# Patient Record
Sex: FEMALE | Race: BLACK OR AFRICAN AMERICAN | NOT HISPANIC OR LATINO | Employment: OTHER | URBAN - METROPOLITAN AREA
[De-identification: names, ages, dates, MRNs, and addresses within clinical notes are randomized per-mention and may not be internally consistent; named-entity substitution may affect disease eponyms.]

---

## 2017-05-31 ENCOUNTER — TELEPHONE (OUTPATIENT)
Dept: FAMILY MEDICINE | Facility: CLINIC | Age: 67
End: 2017-05-31

## 2017-05-31 NOTE — TELEPHONE ENCOUNTER
----- Message from Carri Rehman sent at 5/31/2017  8:40 AM CDT -----  Contact: pt  Pt wants to have a mammo done, no orders in they system, pt can be reached at [phone]///thxMW

## 2017-06-02 ENCOUNTER — TELEPHONE (OUTPATIENT)
Dept: FAMILY MEDICINE | Facility: CLINIC | Age: 67
End: 2017-06-02

## 2017-06-02 DIAGNOSIS — Z12.31 ENCOUNTER FOR SCREENING MAMMOGRAM FOR MALIGNANT NEOPLASM OF BREAST: Primary | ICD-10-CM

## 2017-06-02 NOTE — TELEPHONE ENCOUNTER
----- Message from Shiela Kirby sent at 6/2/2017  9:03 AM CDT -----  Contact: pt   Pt need order attached to [facility] appt that is schedule for 08/01/17.    ..[phone] (home)

## 2017-06-27 ENCOUNTER — OFFICE VISIT (OUTPATIENT)
Dept: OPHTHALMOLOGY | Facility: CLINIC | Age: 67
End: 2017-06-27
Payer: MEDICARE

## 2017-06-27 DIAGNOSIS — H52.7 REFRACTIVE ERROR: ICD-10-CM

## 2017-06-27 DIAGNOSIS — Z13.5 SCREENING FOR GLAUCOMA: ICD-10-CM

## 2017-06-27 PROCEDURE — 99999 PR PBB SHADOW E&M-EST. PATIENT-LVL I: CPT | Mod: PBBFAC,,, | Performed by: OPTOMETRIST

## 2017-06-27 PROCEDURE — 92015 DETERMINE REFRACTIVE STATE: CPT | Mod: ,,, | Performed by: OPTOMETRIST

## 2017-06-27 PROCEDURE — 99211 OFF/OP EST MAY X REQ PHY/QHP: CPT | Mod: PBBFAC,PO | Performed by: OPTOMETRIST

## 2017-06-27 PROCEDURE — 92014 COMPRE OPH EXAM EST PT 1/>: CPT | Mod: S$PBB,,, | Performed by: OPTOMETRIST

## 2017-06-27 NOTE — PROGRESS NOTES
HPI     Last [facility] exam 06/21/2016  Cataract, nuclear, bilateral  Screening for glaucoma  RE  No visual complaints+      Last edited by William Kirby MA on 6/27/2017  8:08 AM. (History)            Assessment /Plan     For exam results, see Encounter Report.    Cataract, nuclear, bilateral    Screening for glaucoma    Refractive error      Mild NS cataracts OU without complaint = discussed and will follow.  OH OK OU otherwise.  Spec Rx given versus OTC readers.  RTC one year.

## 2017-08-01 ENCOUNTER — HOSPITAL ENCOUNTER (OUTPATIENT)
Dept: RADIOLOGY | Facility: HOSPITAL | Age: 67
Discharge: HOME | End: 2017-08-01
Attending: FAMILY MEDICINE
Payer: MEDICARE

## 2017-08-01 VITALS — BODY MASS INDEX: 22.45 KG/M2 | WEIGHT: 122 LBS | HEIGHT: 62 IN

## 2017-08-01 DIAGNOSIS — Z12.31 ENCOUNTER FOR SCREENING MAMMOGRAM FOR MALIGNANT NEOPLASM OF BREAST: ICD-10-CM

## 2017-08-01 PROCEDURE — 77067 SCR MAMMO BI INCL CAD: CPT | Mod: 26,,, | Performed by: RADIOLOGY

## 2017-08-01 PROCEDURE — 77067 SCR MAMMO BI INCL CAD: CPT | Mod: TC

## 2017-08-01 PROCEDURE — 77063 BREAST TOMOSYNTHESIS BI: CPT | Mod: 26,,, | Performed by: RADIOLOGY

## 2017-09-25 ENCOUNTER — OFFICE VISIT (OUTPATIENT)
Dept: FAMILY MEDICINE | Facility: CLINIC | Age: 67
End: 2017-09-25
Payer: MEDICARE

## 2017-09-25 ENCOUNTER — LAB VISIT (OUTPATIENT)
Dept: LAB | Facility: HOSPITAL | Age: 67
End: 2017-09-25
Attending: FAMILY MEDICINE
Payer: MEDICARE

## 2017-09-25 VITALS
HEIGHT: 62 IN | WEIGHT: 120.56 LBS | TEMPERATURE: 96 F | HEART RATE: 76 BPM | SYSTOLIC BLOOD PRESSURE: 126 MMHG | DIASTOLIC BLOOD PRESSURE: 74 MMHG | RESPIRATION RATE: 18 BRPM | BODY MASS INDEX: 22.19 KG/M2

## 2017-09-25 DIAGNOSIS — I10 ESSENTIAL HYPERTENSION: Primary | ICD-10-CM

## 2017-09-25 DIAGNOSIS — E78.5 HYPERLIPIDEMIA, UNSPECIFIED HYPERLIPIDEMIA TYPE: ICD-10-CM

## 2017-09-25 DIAGNOSIS — I10 ESSENTIAL HYPERTENSION: ICD-10-CM

## 2017-09-25 DIAGNOSIS — M85.80 OSTEOPENIA, UNSPECIFIED LOCATION: ICD-10-CM

## 2017-09-25 DIAGNOSIS — R42 VERTIGO: ICD-10-CM

## 2017-09-25 DIAGNOSIS — Z01.419 WELL FEMALE EXAM WITH ROUTINE GYNECOLOGICAL EXAM: ICD-10-CM

## 2017-09-25 LAB
ALBUMIN SERPL BCP-MCNC: 3.9 G/DL
ALP SERPL-CCNC: 61 U/L
ALT SERPL W/O P-5'-P-CCNC: 15 U/L
ANION GAP SERPL CALC-SCNC: 11 MMOL/L
AST SERPL-CCNC: 27 U/L
BASOPHILS # BLD AUTO: 0.09 K/UL
BASOPHILS NFR BLD: 1.1 %
BILIRUB SERPL-MCNC: 0.5 MG/DL
BUN SERPL-MCNC: 20 MG/DL
CALCIUM SERPL-MCNC: 10.7 MG/DL
CHLORIDE SERPL-SCNC: 104 MMOL/L
CHOLEST SERPL-MCNC: 222 MG/DL
CHOLEST/HDLC SERPL: 3.6 {RATIO}
CO2 SERPL-SCNC: 25 MMOL/L
CREAT SERPL-MCNC: 1.3 MG/DL
DIFFERENTIAL METHOD: NORMAL
EOSINOPHIL # BLD AUTO: 0.2 K/UL
EOSINOPHIL NFR BLD: 2.5 %
ERYTHROCYTE [DISTWIDTH] IN BLOOD BY AUTOMATED COUNT: 13.4 %
EST. GFR  (AFRICAN AMERICAN): 49.1 ML/MIN/1.73 M^2
EST. GFR  (NON AFRICAN AMERICAN): 42.6 ML/MIN/1.73 M^2
GLUCOSE SERPL-MCNC: 81 MG/DL
HCT VFR BLD AUTO: 37.7 %
HDLC SERPL-MCNC: 61 MG/DL
HDLC SERPL: 27.5 %
HGB BLD-MCNC: 12.1 G/DL
LDLC SERPL CALC-MCNC: 143.8 MG/DL
LYMPHOCYTES # BLD AUTO: 2.8 K/UL
LYMPHOCYTES NFR BLD: 35.3 %
MCH RBC QN AUTO: 28.4 PG
MCHC RBC AUTO-ENTMCNC: 32.1 G/DL
MCV RBC AUTO: 89 FL
MONOCYTES # BLD AUTO: 0.8 K/UL
MONOCYTES NFR BLD: 9.9 %
NEUTROPHILS # BLD AUTO: 4.1 K/UL
NEUTROPHILS NFR BLD: 51.1 %
NONHDLC SERPL-MCNC: 161 MG/DL
PLATELET # BLD AUTO: 339 K/UL
PMV BLD AUTO: 10.8 FL
POTASSIUM SERPL-SCNC: 3.3 MMOL/L
PROT SERPL-MCNC: 8.2 G/DL
RBC # BLD AUTO: 4.26 M/UL
SODIUM SERPL-SCNC: 140 MMOL/L
TRIGL SERPL-MCNC: 86 MG/DL
TSH SERPL DL<=0.005 MIU/L-ACNC: 0.93 UIU/ML
WBC # BLD AUTO: 8 K/UL

## 2017-09-25 PROCEDURE — 99999 PR PBB SHADOW E&M-EST. PATIENT-LVL III: CPT | Mod: PBBFAC,,, | Performed by: FAMILY MEDICINE

## 2017-09-25 PROCEDURE — 84443 ASSAY THYROID STIM HORMONE: CPT

## 2017-09-25 PROCEDURE — 99214 OFFICE O/P EST MOD 30 MIN: CPT | Mod: 25,S$PBB,, | Performed by: FAMILY MEDICINE

## 2017-09-25 PROCEDURE — 80061 LIPID PANEL: CPT

## 2017-09-25 PROCEDURE — 36415 COLL VENOUS BLD VENIPUNCTURE: CPT | Mod: PO

## 2017-09-25 PROCEDURE — 99213 OFFICE O/P EST LOW 20 MIN: CPT | Mod: PBBFAC,PO,25 | Performed by: FAMILY MEDICINE

## 2017-09-25 PROCEDURE — G0008 ADMIN INFLUENZA VIRUS VAC: HCPCS | Mod: PBBFAC,PO

## 2017-09-25 PROCEDURE — G0101 CA SCREEN;PELVIC/BREAST EXAM: HCPCS | Mod: PBBFAC,PO | Performed by: FAMILY MEDICINE

## 2017-09-25 PROCEDURE — 1126F AMNT PAIN NOTED NONE PRSNT: CPT | Mod: ,,, | Performed by: FAMILY MEDICINE

## 2017-09-25 PROCEDURE — 80053 COMPREHEN METABOLIC PANEL: CPT

## 2017-09-25 PROCEDURE — 1159F MED LIST DOCD IN RCRD: CPT | Mod: ,,, | Performed by: FAMILY MEDICINE

## 2017-09-25 PROCEDURE — G0101 CA SCREEN;PELVIC/BREAST EXAM: HCPCS | Mod: S$PBB,,, | Performed by: FAMILY MEDICINE

## 2017-09-25 PROCEDURE — 85025 COMPLETE CBC W/AUTO DIFF WBC: CPT

## 2017-09-25 PROCEDURE — 3074F SYST BP LT 130 MM HG: CPT | Mod: ,,, | Performed by: FAMILY MEDICINE

## 2017-09-25 PROCEDURE — 3078F DIAST BP <80 MM HG: CPT | Mod: ,,, | Performed by: FAMILY MEDICINE

## 2017-09-25 RX ORDER — ONDANSETRON 4 MG/1
4 TABLET, FILM COATED ORAL EVERY 12 HOURS PRN
Qty: 12 TABLET | Refills: 5 | Status: SHIPPED | OUTPATIENT
Start: 2017-09-25 | End: 2019-10-31

## 2017-09-25 RX ORDER — HYDROCHLOROTHIAZIDE 12.5 MG/1
12.5 CAPSULE ORAL DAILY
Qty: 30 CAPSULE | Refills: 11 | Status: SHIPPED | OUTPATIENT
Start: 2017-09-25 | End: 2018-09-21 | Stop reason: SDUPTHER

## 2017-09-25 RX ORDER — MECLIZINE HYDROCHLORIDE 25 MG/1
25-50 TABLET ORAL
Qty: 30 TABLET | Refills: 5 | Status: SHIPPED | OUTPATIENT
Start: 2017-09-25 | End: 2018-09-28

## 2017-09-25 RX ORDER — IBUPROFEN 800 MG/1
800 TABLET ORAL 3 TIMES DAILY PRN
Qty: 60 TABLET | Refills: 5 | Status: SHIPPED | OUTPATIENT
Start: 2017-09-25 | End: 2018-09-28 | Stop reason: SDUPTHER

## 2017-09-25 NOTE — PROGRESS NOTES
CHIEF COMPLAINT: This is a 66-year-old female here for preventive health exam.    SUBJECTIVE: Patient's doing well without complaints except for bump on posterior neck.  She reports that knee pain has diminished.  Blood pressure is controlled on hydrochlorothiazide.  Patient requests refill of meclizine and ondansetron for dizziness and nausea.  She takes ibuprofen 800 mg as needed for knee pain.     Eye exam June 2017.  Pap smear September 2014. Mammogram, August 2017.  Bone DEXA scan, October 2016, due again in October 2019 to October 2021.  Colonoscopy May 2010, due again in May 2018. Tdap August 2012. Zostavax August 2012.  Prevnar vaccine September 2015.  Pneumovax September 2016.     ROS:  GENERAL: Patient denies fever, chills, night sweats. Patient denies weight gain or loss. Patient denies anorexia, fatigue, weakness or swollen glands.  SKIN: Patient denies rash or hair loss.  HEENT: Patient denies sore throat, ear pain, hearing loss, nasal congestion, or runny nose. Patient denies visual disturbance, eye irritation or discharge.  LUNGS: Patient denies cough, wheeze or hemoptysis.  CARDIOVASCULAR: Patient denies chest pain, shortness of breath, palpitations, syncope or lower extremity edema.  GI: Patient denies abdominal pain, nausea, vomiting, diarrhea, constipation, blood in stool or melena.  GENITOURINARY: Patient denies pelvic pain, vaginal discharge, itch or odor. Patient denies irregular vaginal bleeding. Patient denies dysuria, frequency, hematuria, nocturia, urgency or incontinence.  BREASTS: Patient denies breast pain, mass or nipple discharge.  MUSCULOSKELETAL: Patient denies joint swelling, redness or warmth.  NEUROLOGIC: Patient denies headache, vertigo, paresthesias, weakness in limb, dysarthria, dysphagia or abnormality of gait.  PSYCHIATRIC: Patient denies anxiety, depression, or memory loss.     OBJECTIVE:   GENERAL: Well-developed well-nourished black female alert and oriented x3, in no  acute distress.   SKIN: Clear without rash.  Normal color and tone.  Sebaceous cyst, posterior neck.  HEENT: Eyes: Clear conjunctivae. No scleral icterus. Pupils equal reactive to light and accommodation. Ears: Clear TMs. Clear canals. Nose: Without congestion. Pharynx: Without injection or exudates.    NECK: Supple, normal range of motion. No masses, lymphadenopathy or enlarged thyroid. No JVD. Carotids 2+ and equal. No bruits.    LUNGS: Clear to auscultation. Normal respiratory effort.    CARDIOVASCULAR: Regular rhythm, normal S1, S2 without murmur, gallop or rub.    BACK: No CVA or spinal tenderness.    BREASTS: No masses, tenderness or nipple discharge.    ABDOMEN: Normal appearance. Active bowel sounds. Soft, nontender without mass or organomegaly. No rebound or guarding.    EXTREMITIES: Without cyanosis, clubbing or edema. Distal pulses 2+ and equal. Normal range of motion in all extremities. No joint effusion, erythema or warmth. Bunions bilaterally, left greater than right.  Right knee enlargement greater than left.  NEUROLOGIC: Cranial nerves II through XII without deficit. Motor strength equal bilaterally. Sensation normal to touch. Deep tendon reflexes 2+ and equal. Gait without abnormality. No tremor. Negative cerebellar signs.    PELVIC: External: Without lesions or inflammation. Vaginal: Atrophic changes. Cervix: Normal appearance, nontender. No Pap. Uterus: Normal size and shape. Adnexa: Non-tender, without masses. Rectovaginal: Confirms, heme-negative stool x2.       ASSESSMENT:  1. Essential hypertension    2. Hyperlipidemia, unspecified hyperlipidemia type    3. Osteopenia, unspecified location    4. Vertigo      PLAN:   1.  Exercise regularly.  2.  Age-appropriate counseling.  3.  Fasting lab.  4.  Influenza vaccine.  5.  Refill medications.  6.  Follow-up annually.

## 2018-03-23 ENCOUNTER — TELEPHONE (OUTPATIENT)
Dept: FAMILY MEDICINE | Facility: CLINIC | Age: 68
End: 2018-03-23

## 2018-03-23 DIAGNOSIS — Z12.31 ENCOUNTER FOR SCREENING MAMMOGRAM FOR MALIGNANT NEOPLASM OF BREAST: Primary | ICD-10-CM

## 2018-03-23 NOTE — TELEPHONE ENCOUNTER
----- Message from Shelly Godwin sent at 3/23/2018 10:44 AM CDT -----  Contact: Pt   Pt called and stated she needed to speak to the nurse.    Thanks,  TF

## 2018-06-27 ENCOUNTER — OFFICE VISIT (OUTPATIENT)
Dept: OPHTHALMOLOGY | Facility: CLINIC | Age: 68
End: 2018-06-27
Payer: MEDICARE

## 2018-06-27 DIAGNOSIS — H25.13 CATARACT, NUCLEAR SCLEROTIC SENILE, BILATERAL: Primary | ICD-10-CM

## 2018-06-27 DIAGNOSIS — H52.13 MYOPIA OF BOTH EYES: ICD-10-CM

## 2018-06-27 DIAGNOSIS — H52.4 PRESBYOPIA: ICD-10-CM

## 2018-06-27 DIAGNOSIS — Z13.5 GLAUCOMA SCREENING: ICD-10-CM

## 2018-06-27 PROCEDURE — 99999 PR PBB SHADOW E&M-EST. PATIENT-LVL I: CPT | Mod: PBBFAC,,, | Performed by: OPTOMETRIST

## 2018-06-27 PROCEDURE — 92014 COMPRE OPH EXAM EST PT 1/>: CPT | Mod: S$PBB,,, | Performed by: OPTOMETRIST

## 2018-06-27 PROCEDURE — 99211 OFF/OP EST MAY X REQ PHY/QHP: CPT | Mod: PBBFAC,PO | Performed by: OPTOMETRIST

## 2018-06-27 PROCEDURE — 92015 DETERMINE REFRACTIVE STATE: CPT | Mod: ,,, | Performed by: OPTOMETRIST

## 2018-06-27 NOTE — PROGRESS NOTES
HPI     Last [facility] exam 06/27/2017  Cataract, nuclear, bilateral  Screening for glaucoma  RE  No visual complaint  Using OTC Readers +2.75    Last edited by William Kirby MA on 6/27/2018  8:02 AM. (History)            Assessment /Plan     For exam results, see Encounter Report.    Cataract, nuclear sclerotic senile, bilateral    Glaucoma screening    Myopia of both eyes    Presbyopia      Mild NS cataracts without complaint = will follow.  OH OK OU otherwise.  Spec Rx given versus OTC readers.  RTC one year or prn.

## 2018-08-02 ENCOUNTER — HOSPITAL ENCOUNTER (OUTPATIENT)
Dept: RADIOLOGY | Facility: HOSPITAL | Age: 68
Discharge: HOME | End: 2018-08-02
Attending: FAMILY MEDICINE
Payer: MEDICARE

## 2018-08-02 VITALS — WEIGHT: 120 LBS | HEIGHT: 62 IN | BODY MASS INDEX: 22.08 KG/M2

## 2018-08-02 DIAGNOSIS — Z12.31 ENCOUNTER FOR SCREENING MAMMOGRAM FOR MALIGNANT NEOPLASM OF BREAST: ICD-10-CM

## 2018-08-02 PROCEDURE — 77063 BREAST TOMOSYNTHESIS BI: CPT | Mod: 26,,, | Performed by: RADIOLOGY

## 2018-08-02 PROCEDURE — 77063 BREAST TOMOSYNTHESIS BI: CPT | Mod: TC,PO

## 2018-08-02 PROCEDURE — 77067 SCR MAMMO BI INCL CAD: CPT | Mod: 26,,, | Performed by: RADIOLOGY

## 2018-09-14 ENCOUNTER — PATIENT OUTREACH (OUTPATIENT)
Dept: ADMINISTRATIVE | Facility: HOSPITAL | Age: 68
End: 2018-09-14

## 2018-09-21 ENCOUNTER — PES CALL (OUTPATIENT)
Dept: ADMINISTRATIVE | Facility: CLINIC | Age: 68
End: 2018-09-21

## 2018-09-21 RX ORDER — HYDROCHLOROTHIAZIDE 12.5 MG/1
12.5 CAPSULE ORAL DAILY
Qty: 30 CAPSULE | Refills: 0 | Status: SHIPPED | OUTPATIENT
Start: 2018-09-21 | End: 2018-10-24 | Stop reason: SDUPTHER

## 2018-09-28 ENCOUNTER — OFFICE VISIT (OUTPATIENT)
Dept: FAMILY MEDICINE | Facility: CLINIC | Age: 68
End: 2018-09-28
Payer: MEDICARE

## 2018-09-28 ENCOUNTER — LAB VISIT (OUTPATIENT)
Dept: LAB | Facility: HOSPITAL | Age: 68
End: 2018-09-28
Attending: FAMILY MEDICINE
Payer: MEDICARE

## 2018-09-28 VITALS
HEART RATE: 54 BPM | OXYGEN SATURATION: 97 % | HEIGHT: 62 IN | WEIGHT: 115.06 LBS | TEMPERATURE: 99 F | SYSTOLIC BLOOD PRESSURE: 124 MMHG | OXYGEN SATURATION: 97 % | BODY MASS INDEX: 21.17 KG/M2 | DIASTOLIC BLOOD PRESSURE: 72 MMHG | HEIGHT: 62 IN | DIASTOLIC BLOOD PRESSURE: 72 MMHG | BODY MASS INDEX: 21.17 KG/M2 | WEIGHT: 115.06 LBS | SYSTOLIC BLOOD PRESSURE: 124 MMHG | TEMPERATURE: 99 F | HEART RATE: 54 BPM

## 2018-09-28 DIAGNOSIS — Z00.00 ENCOUNTER FOR PREVENTIVE HEALTH EXAMINATION: Primary | ICD-10-CM

## 2018-09-28 DIAGNOSIS — M17.0 PRIMARY OSTEOARTHRITIS OF BOTH KNEES: ICD-10-CM

## 2018-09-28 DIAGNOSIS — I10 ESSENTIAL HYPERTENSION: Primary | ICD-10-CM

## 2018-09-28 DIAGNOSIS — M85.80 OSTEOPENIA, UNSPECIFIED LOCATION: ICD-10-CM

## 2018-09-28 DIAGNOSIS — R42 VERTIGO: ICD-10-CM

## 2018-09-28 DIAGNOSIS — E55.9 VITAMIN D DEFICIENCY DISEASE: ICD-10-CM

## 2018-09-28 DIAGNOSIS — I10 ESSENTIAL HYPERTENSION: ICD-10-CM

## 2018-09-28 DIAGNOSIS — Z12.11 COLON CANCER SCREENING: ICD-10-CM

## 2018-09-28 DIAGNOSIS — E78.5 HYPERLIPIDEMIA, UNSPECIFIED HYPERLIPIDEMIA TYPE: ICD-10-CM

## 2018-09-28 LAB
ALBUMIN SERPL BCP-MCNC: 4.3 G/DL
ALP SERPL-CCNC: 50 U/L
ALT SERPL W/O P-5'-P-CCNC: 17 U/L
ANION GAP SERPL CALC-SCNC: 9 MMOL/L
AST SERPL-CCNC: 32 U/L
BASOPHILS # BLD AUTO: 0.08 K/UL
BASOPHILS NFR BLD: 1.4 %
BILIRUB SERPL-MCNC: 0.7 MG/DL
BUN SERPL-MCNC: 19 MG/DL
CALCIUM SERPL-MCNC: 10.2 MG/DL
CHLORIDE SERPL-SCNC: 105 MMOL/L
CHOLEST SERPL-MCNC: 198 MG/DL
CHOLEST/HDLC SERPL: 3.3 {RATIO}
CO2 SERPL-SCNC: 26 MMOL/L
CREAT SERPL-MCNC: 1.1 MG/DL
DIFFERENTIAL METHOD: ABNORMAL
EOSINOPHIL # BLD AUTO: 0.1 K/UL
EOSINOPHIL NFR BLD: 2.1 %
ERYTHROCYTE [DISTWIDTH] IN BLOOD BY AUTOMATED COUNT: 12.3 %
EST. GFR  (AFRICAN AMERICAN): 59.6 ML/MIN/1.73 M^2
EST. GFR  (NON AFRICAN AMERICAN): 51.7 ML/MIN/1.73 M^2
GLUCOSE SERPL-MCNC: 88 MG/DL
HCT VFR BLD AUTO: 39 %
HDLC SERPL-MCNC: 60 MG/DL
HDLC SERPL: 30.3 %
HGB BLD-MCNC: 12.2 G/DL
IMM GRANULOCYTES # BLD AUTO: 0.01 K/UL
IMM GRANULOCYTES NFR BLD AUTO: 0.2 %
LDLC SERPL CALC-MCNC: 123 MG/DL
LYMPHOCYTES # BLD AUTO: 2.2 K/UL
LYMPHOCYTES NFR BLD: 37.6 %
MCH RBC QN AUTO: 28.1 PG
MCHC RBC AUTO-ENTMCNC: 31.3 G/DL
MCV RBC AUTO: 90 FL
MONOCYTES # BLD AUTO: 0.6 K/UL
MONOCYTES NFR BLD: 10.1 %
NEUTROPHILS # BLD AUTO: 2.8 K/UL
NEUTROPHILS NFR BLD: 48.6 %
NONHDLC SERPL-MCNC: 138 MG/DL
NRBC BLD-RTO: 0 /100 WBC
PLATELET # BLD AUTO: 322 K/UL
PMV BLD AUTO: 10.7 FL
POTASSIUM SERPL-SCNC: 2.9 MMOL/L
PROT SERPL-MCNC: 8.3 G/DL
RBC # BLD AUTO: 4.34 M/UL
SODIUM SERPL-SCNC: 140 MMOL/L
TRIGL SERPL-MCNC: 75 MG/DL
TSH SERPL DL<=0.005 MIU/L-ACNC: 1.07 UIU/ML
WBC # BLD AUTO: 5.77 K/UL

## 2018-09-28 PROCEDURE — G0439 PPPS, SUBSEQ VISIT: HCPCS | Mod: S$GLB,,, | Performed by: NURSE PRACTITIONER

## 2018-09-28 PROCEDURE — 99215 OFFICE O/P EST HI 40 MIN: CPT | Mod: PBBFAC,PO | Performed by: FAMILY MEDICINE

## 2018-09-28 PROCEDURE — 84443 ASSAY THYROID STIM HORMONE: CPT

## 2018-09-28 PROCEDURE — 80061 LIPID PANEL: CPT

## 2018-09-28 PROCEDURE — 85025 COMPLETE CBC W/AUTO DIFF WBC: CPT

## 2018-09-28 PROCEDURE — 99999 PR PBB SHADOW E&M-EST. PATIENT-LVL IV: CPT | Mod: PBBFAC,,, | Performed by: NURSE PRACTITIONER

## 2018-09-28 PROCEDURE — 99214 OFFICE O/P EST MOD 30 MIN: CPT | Mod: PBBFAC,25,27,PO | Performed by: NURSE PRACTITIONER

## 2018-09-28 PROCEDURE — 80053 COMPREHEN METABOLIC PANEL: CPT

## 2018-09-28 PROCEDURE — 90662 IIV NO PRSV INCREASED AG IM: CPT | Mod: PBBFAC,PO

## 2018-09-28 PROCEDURE — 99999 PR PBB SHADOW E&M-EST. PATIENT-LVL V: CPT | Mod: PBBFAC,,, | Performed by: FAMILY MEDICINE

## 2018-09-28 PROCEDURE — 99214 OFFICE O/P EST MOD 30 MIN: CPT | Mod: 25,S$PBB,, | Performed by: FAMILY MEDICINE

## 2018-09-28 PROCEDURE — 36415 COLL VENOUS BLD VENIPUNCTURE: CPT | Mod: PO

## 2018-09-28 RX ORDER — CHOLECALCIFEROL (VITAMIN D3) 25 MCG
1000 TABLET ORAL 2 TIMES DAILY
COMMUNITY

## 2018-09-28 RX ORDER — CALCIUM CARBONATE 600 MG
600 TABLET ORAL DAILY
COMMUNITY

## 2018-09-28 RX ORDER — IRON 18 MG
TABLET ORAL
COMMUNITY

## 2018-09-28 RX ORDER — IBUPROFEN 800 MG/1
800 TABLET ORAL 3 TIMES DAILY PRN
Qty: 60 TABLET | Refills: 5 | Status: SHIPPED | OUTPATIENT
Start: 2018-09-28 | End: 2019-10-31

## 2018-09-28 NOTE — Clinical Note
Your patient was seen today for a HRA visit.I have included a copy of my visit note, please review the note and feel free to contact me with any questions. Thank you for allowing me to participate in the care of your patients. Delicia Rehman NP

## 2018-09-28 NOTE — PATIENT INSTRUCTIONS
Counseling and Referral of Other Preventative  (Italic type indicates deductible and co-insurance are waived)    Patient Name: Josy Rehman  Today's Date: 9/28/2018    Health Maintenance       Date Due Completion Date    Colonoscopy 05/20/2018 5/20/2010    Influenza Vaccine 08/01/2018 9/25/2017    Lipid Panel 09/25/2018 9/25/2017    Mammogram 08/02/2019 8/2/2018    DEXA SCAN 10/03/2019 10/3/2016    Override on 8/24/2012: Done (REPEAT 3 YRS)    TETANUS VACCINE 08/13/2022 8/13/2012        No orders of the defined types were placed in this encounter.    The following information is provided to all patients.  This information is to help you find resources for any of the problems found today that may be affecting your health:                Living healthy guide: www.[facility].[place].gov      Understanding Diabetes: www.diabetes.org      Eating healthy: www.cdc.gov/healthyweight      [facility] home safety checklist: www.cdc.gov/steadi/patient.html      Agency on Aging: www.goea.[place].gov      Alcoholics anonymous (AA): www.aa.org      Physical Activity: www.cindy.nih.gov/tv1ooha      Tobacco use: www.quitwithusla.org

## 2018-09-28 NOTE — PROGRESS NOTES
"Josy Rehman presented for a  Medicare AWV and comprehensive Health Risk Assessment today. The following components were reviewed and updated:    · Medical history  · Family History  · Social history  · Allergies and Current Medications  · Health Risk Assessment  · Health Maintenance  · Care Team     ** See Completed Assessments for Annual Wellness Visit within the encounter summary.**       The following assessments were completed:  · Living Situation  · CAGE  · Depression Screening  · Timed Get Up and Go  · Whisper Test  · Cognitive Function Screening  · Nutrition Screening  · ADL Screening  · PAQ Screening    Vitals:    09/28/18 0841   BP: 124/72   BP Location: Left arm   Patient Position: Sitting   Pulse: (!) 54   Temp: 98.7 °F (37.1 °C)   TempSrc: Tympanic   SpO2: 97%   Weight: 52.2 kg (115 lb 1.3 oz)   Height: 5' 2" (1.575 m)     Body mass index is 21.05 kg/m².  Physical Exam   Constitutional: She appears well-developed.   HENT:   Head: Normocephalic and atraumatic.   Eyes: Pupils are equal, round, and reactive to light.   Neck: Carotid bruit is not present.   Cardiovascular: Normal rate, regular rhythm, normal heart sounds, intact distal pulses and normal pulses. Exam reveals no gallop.   No murmur heard.  Pulmonary/Chest: Effort normal and breath sounds normal.   Abdominal: Soft. Normal appearance and bowel sounds are normal. She exhibits no distension. There is no tenderness.   Musculoskeletal: Normal range of motion. She exhibits no edema or tenderness.   Neurological: She is alert. She exhibits normal muscle tone. Gait normal.   Skin: Skin is warm, dry and intact.   Psychiatric: She has a normal mood and affect. Her speech is normal and behavior is normal. Judgment and thought content normal. Cognition and memory are normal.   Nursing note and vitals reviewed.        Diagnoses and health risks identified today and associated recommendations/orders:    1. Encounter for preventive health examination    2. " Essential hypertension  Chronic and Stable on HTCZ daily. Continue current treatment plan as previously prescribed with your PCP    3. Vertigo  Chronic and Stable . States no recent episode of dizziness but has Antivert as needed. Continue current treatment plan as previously prescribed with your PCP    4. Osteopenia, unspecified location  DEXA 10/ 13/ 2016 ...... Due for a repeat in  3 years  Chronic and Stable on vitamin D and calicum supplements. Continue current treatment plan as previously prescribed with your PCP    5. Vitamin D deficiency disease  Chronic and Stable on Vitamin D supplements. Continue current treatment plan as previously prescribed with your PCP    6. Hyperlipidemia, unspecified hyperlipidemia type  Component      Latest Ref Rng & Units 9/28/2018   Cholesterol      120 - 199 mg/dL 198   Triglycerides      30 - 150 mg/dL 75   HDL      40 - 75 mg/dL 60   LDL Cholesterol      63.0 - 159.0 mg/dL 123.0   HDL/Chol Ratio      20.0 - 50.0 % 30.3   Total Cholesterol/HDL Ratio      2.0 - 5.0 3.3   Non-HDL Cholesterol      mg/dL 138   Chronic and Stable on diet modifications  Continue current treatment plan as previously prescribed with your PCP    7. Primary osteoarthritis of both knees  Chronic and Stable on Tylenol as needed . Continue current treatment plan as previously prescribed with your PCP    Pt to call and schedule colonoscopy today and follow PCP for physical exam today     I offered to discuss end of life issues, including information on how to make advance directives that the patient could use to name someone who would make medical decisions on their behalf if they became too ill to make themselves.    ___Patient declined  _X_Patient is interested, I provided paper work and offered to discuss.    Provided Josy with a 5-10 year written screening schedule and personal prevention plan. Recommendations were developed using the USPSTF age appropriate recommendations. Education, counseling, and  referrals were provided as needed. After Visit Summary printed and given to patient which includes a list of additional screenings\tests needed.    Follow-up in about 1 year (around 9/28/2019).    Delicia Rehman NP

## 2018-09-28 NOTE — PROGRESS NOTES
CHIEF COMPLAINT:  This is a 67-year-old female here for preventive health exam.    SUBJECTIVE: Patient's doing well without complaints.  She has lost 5 lb in the last year eating a more healthy diet.  She reports that knee pain has diminished but she takes ibuprofen 800 mg occasionally when pain flares up or for headache.  She has hypertension which is  controlled on hydrochlorothiazide.  Patient has not had any recent episodes of vertigo for which she has taken meclizine and ondansetron in the past.      Eye exam June 2018 .  Pap smear September 2014. Mammogram, August 2018 .  Bone DEXA scan, October 2016, due again in October 2019 to October 2021.  Colonoscopy May 2010, due again in May 2018. Tdap August 2012. Zostavax August 2012.  Prevnar vaccine September 2015.  Pneumovax September 2016.  Influenza vaccine September 2017.     ROS:  GENERAL: Patient denies fever, chills, night sweats. Patient denies weight gain. Patient denies anorexia, fatigue, weakness or swollen glands.  SKIN: Patient denies rash or hair loss.  HEENT: Patient denies sore throat, ear pain, hearing loss, nasal congestion, or runny nose. Patient denies visual disturbance, eye irritation or discharge.  LUNGS: Patient denies cough, wheeze or hemoptysis.  CARDIOVASCULAR: Patient denies chest pain, shortness of breath, palpitations, syncope or lower extremity edema.  GI: Patient denies abdominal pain, nausea, vomiting, diarrhea, constipation, blood in stool or melena.  GENITOURINARY: Patient denies pelvic pain, vaginal discharge, itch or odor. Patient denies irregular vaginal bleeding. Patient denies dysuria, frequency, hematuria, nocturia, urgency or incontinence.  BREASTS: Patient denies breast pain, mass or nipple discharge.  MUSCULOSKELETAL: Patient denies joint swelling, redness or warmth.  NEUROLOGIC: Patient denies headache, vertigo, paresthesias, weakness in limb, dysarthria, dysphagia or abnormality of gait.  PSYCHIATRIC: Patient denies  anxiety, depression, or memory loss.     OBJECTIVE:   GENERAL: Well-developed well-nourished black female alert and oriented x3, in no acute distress. Weight loss of 5 lb in the last year.  SKIN: Clear without rash.  Normal color and tone.  Sebaceous cyst, posterior neck.  HEENT: Eyes: Clear conjunctivae. No scleral icterus. Pupils equal reactive to light and accommodation. Ears: Clear TMs. Clear canals. Nose: Without congestion. Pharynx: Without injection or exudates.    NECK: Supple, normal range of motion. No masses, lymphadenopathy or enlarged thyroid. No JVD. Carotids 2+ and equal. No bruits.    LUNGS: Clear to auscultation. Normal respiratory effort.    CARDIOVASCULAR: Regular rhythm, normal S1, S2 without murmur, gallop or rub.    BACK: No CVA or spinal tenderness.    BREASTS: No masses, tenderness or nipple discharge.    ABDOMEN: Normal appearance. Active bowel sounds. Soft, nontender without mass or organomegaly. No rebound or guarding.    EXTREMITIES: Without cyanosis, clubbing or edema. Distal pulses 2+ and equal. Normal range of motion in all extremities. No joint effusion, erythema or warmth. Bunions bilaterally, left greater than right.  Right knee enlargement greater than left.  NEUROLOGIC: Cranial nerves II through XII without deficit. Motor strength equal bilaterally. Sensation normal to touch. Deep tendon reflexes 2+ and equal. Gait without abnormality. No tremor. Negative cerebellar signs.    PELVIC: External: Without lesions or inflammation. Vaginal: Atrophic changes. Cervix: Normal appearance, nontender. No Pap. Uterus: Normal size and shape. Adnexa: Non-tender, without masses. Rectovaginal: Confirms, heme-negative stool x2.      ASSESSMENT:  1. Essential hypertension    2. Hyperlipidemia, unspecified hyperlipidemia type    3. Primary osteoarthritis of both knees    4. Osteopenia, unspecified location    5. Colon cancer screening      PLAN:   1.  Exercise regularly.  2.  Age-appropriate  counseling.  3.  Fasting lab.  4.  Influenza vaccine.  5.  Refill medications.  6.  Follow-up annually.

## 2018-10-01 ENCOUNTER — TELEPHONE (OUTPATIENT)
Dept: FAMILY MEDICINE | Facility: CLINIC | Age: 68
End: 2018-10-01

## 2018-10-01 DIAGNOSIS — E87.6 HYPOKALEMIA: Primary | ICD-10-CM

## 2018-10-01 RX ORDER — POTASSIUM CHLORIDE 750 MG/1
10 CAPSULE, EXTENDED RELEASE ORAL DAILY
Qty: 30 CAPSULE | Refills: 11 | Status: SHIPPED | OUTPATIENT
Start: 2018-10-01 | End: 2018-12-10

## 2018-10-01 NOTE — TELEPHONE ENCOUNTER
----- Message from China Ortiz MD sent at 9/30/2018  8:54 PM CDT -----  Contact patient:  Potassium very low and needs to start potassium supplement.

## 2018-10-01 NOTE — TELEPHONE ENCOUNTER
----- Message from Arnulfo Sahni sent at 10/1/2018  1:24 PM CDT -----  Contact: pt  She's calling ion regards to a missed call, [phone] (home)

## 2018-10-02 ENCOUNTER — DOCUMENTATION ONLY (OUTPATIENT)
Dept: ENDOSCOPY | Facility: HOSPITAL | Age: 68
End: 2018-10-02

## 2018-10-02 RX ORDER — SODIUM, POTASSIUM,MAG SULFATES 17.5-3.13G
SOLUTION, RECONSTITUTED, ORAL ORAL
Qty: 354 ML | Refills: 0 | Status: ON HOLD | OUTPATIENT
Start: 2018-10-02 | End: 2018-11-21 | Stop reason: ALTCHOICE

## 2018-10-02 NOTE — TELEPHONE ENCOUNTER
----- Message from Ursula Ledbetter sent at 10/2/2018  1:53 PM CDT -----  Pt is returning a call to nurse.          Please call pt back at [phone]

## 2018-10-02 NOTE — PROGRESS NOTES
10/02/18 Per [employer], Person pressed touch tone key to speak with an endoscopy .  Colonoscopy scheduled for 11/21/18. Instructions given and mailed. Suprep ordered.

## 2018-10-02 NOTE — TELEPHONE ENCOUNTER
----- Message from Michele Sierra sent at 10/2/2018  2:16 PM CDT -----  Contact: Pt.   Pt is returning missed call. ..[phone] (fkpj)

## 2018-10-20 RX ORDER — HYDROCHLOROTHIAZIDE 12.5 MG/1
12.5 CAPSULE ORAL DAILY
Qty: 30 CAPSULE | Refills: 0 | Status: CANCELLED | OUTPATIENT
Start: 2018-10-20

## 2018-10-24 RX ORDER — HYDROCHLOROTHIAZIDE 12.5 MG/1
12.5 CAPSULE ORAL DAILY
Qty: 30 CAPSULE | Refills: 0 | Status: ON HOLD | OUTPATIENT
Start: 2018-10-24 | End: 2018-11-21 | Stop reason: SDUPTHER

## 2018-10-24 NOTE — TELEPHONE ENCOUNTER
----- Message from Valentine Haynes sent at 10/24/2018  2:43 PM CDT -----  Contact: pt  Please call pt @ [phone] regarding blood pressure medication, pt states [facility] did not approve.

## 2018-10-25 ENCOUNTER — LAB VISIT (OUTPATIENT)
Dept: LAB | Facility: HOSPITAL | Age: 68
End: 2018-10-25
Attending: FAMILY MEDICINE
Payer: MEDICARE

## 2018-10-25 DIAGNOSIS — E87.6 HYPOKALEMIA: ICD-10-CM

## 2018-10-25 LAB
ANION GAP SERPL CALC-SCNC: 10 MMOL/L
BUN SERPL-MCNC: 22 MG/DL
CALCIUM SERPL-MCNC: 10.6 MG/DL
CHLORIDE SERPL-SCNC: 106 MMOL/L
CO2 SERPL-SCNC: 24 MMOL/L
CREAT SERPL-MCNC: 1.1 MG/DL
EST. GFR  (AFRICAN AMERICAN): 59.2 ML/MIN/1.73 M^2
EST. GFR  (NON AFRICAN AMERICAN): 51.3 ML/MIN/1.73 M^2
GLUCOSE SERPL-MCNC: 81 MG/DL
POTASSIUM SERPL-SCNC: 3.5 MMOL/L
SODIUM SERPL-SCNC: 140 MMOL/L

## 2018-10-25 PROCEDURE — 80048 BASIC METABOLIC PNL TOTAL CA: CPT

## 2018-10-25 PROCEDURE — 36415 COLL VENOUS BLD VENIPUNCTURE: CPT | Mod: PO

## 2018-11-20 RX ORDER — SODIUM CHLORIDE 0.9 % (FLUSH) 0.9 %
3 SYRINGE (ML) INJECTION
Status: CANCELLED | OUTPATIENT
Start: 2018-11-20

## 2018-11-21 ENCOUNTER — TELEPHONE (OUTPATIENT)
Dept: SURGERY | Facility: CLINIC | Age: 68
End: 2018-11-21

## 2018-11-21 ENCOUNTER — ANESTHESIA EVENT (OUTPATIENT)
Dept: ENDOSCOPY | Facility: HOSPITAL | Age: 68
End: 2018-11-21
Payer: MEDICARE

## 2018-11-21 ENCOUNTER — HOSPITAL ENCOUNTER (OUTPATIENT)
Facility: HOSPITAL | Age: 68
Discharge: HOME | End: 2018-11-21
Attending: SURGERY | Admitting: SURGERY
Payer: MEDICARE

## 2018-11-21 ENCOUNTER — HOSPITAL ENCOUNTER (OUTPATIENT)
Dept: RADIOLOGY | Facility: HOSPITAL | Age: 68
Discharge: HOME | End: 2018-11-21
Attending: SURGERY | Admitting: SURGERY
Payer: MEDICARE

## 2018-11-21 ENCOUNTER — ANESTHESIA (OUTPATIENT)
Dept: ENDOSCOPY | Facility: HOSPITAL | Age: 68
End: 2018-11-21
Payer: MEDICARE

## 2018-11-21 VITALS
HEIGHT: 62 IN | SYSTOLIC BLOOD PRESSURE: 144 MMHG | OXYGEN SATURATION: 99 % | BODY MASS INDEX: 20.98 KG/M2 | WEIGHT: 114 LBS | HEART RATE: 66 BPM | TEMPERATURE: 98 F | RESPIRATION RATE: 16 BRPM | DIASTOLIC BLOOD PRESSURE: 83 MMHG

## 2018-11-21 DIAGNOSIS — Z12.11 SPECIAL SCREENING FOR MALIGNANT NEOPLASMS, COLON: ICD-10-CM

## 2018-11-21 DIAGNOSIS — C18.9 MALIGNANT NEOPLASM OF COLON, UNSPECIFIED PART OF COLON: Primary | ICD-10-CM

## 2018-11-21 PROCEDURE — G0121 COLON CA SCRN NOT HI RSK IND: HCPCS | Performed by: SURGERY

## 2018-11-21 PROCEDURE — 25000003 PHARM REV CODE 250: Performed by: NURSE ANESTHETIST, CERTIFIED REGISTERED

## 2018-11-21 PROCEDURE — 63600175 PHARM REV CODE 636 W HCPCS: Performed by: NURSE ANESTHETIST, CERTIFIED REGISTERED

## 2018-11-21 PROCEDURE — 25000003 PHARM REV CODE 250: Performed by: SURGERY

## 2018-11-21 PROCEDURE — 25500020 PHARM REV CODE 255: Performed by: SURGERY

## 2018-11-21 PROCEDURE — G0121 COLON CA SCRN NOT HI RSK IND: HCPCS | Mod: 53,,, | Performed by: SURGERY

## 2018-11-21 PROCEDURE — 74270 X-RAY XM COLON 1CNTRST STD: CPT | Mod: TC

## 2018-11-21 PROCEDURE — 37000008 HC ANESTHESIA 1ST 15 MINUTES: Performed by: SURGERY

## 2018-11-21 PROCEDURE — 37000009 HC ANESTHESIA EA ADD 15 MINS: Performed by: SURGERY

## 2018-11-21 RX ORDER — HYDROCHLOROTHIAZIDE 12.5 MG/1
12.5 CAPSULE ORAL DAILY
Qty: 30 CAPSULE | Refills: 0 | Status: ON HOLD | OUTPATIENT
Start: 2018-11-21 | End: 2018-12-27 | Stop reason: SDUPTHER

## 2018-11-21 RX ORDER — SODIUM CHLORIDE, SODIUM LACTATE, POTASSIUM CHLORIDE, CALCIUM CHLORIDE 600; 310; 30; 20 MG/100ML; MG/100ML; MG/100ML; MG/100ML
INJECTION, SOLUTION INTRAVENOUS CONTINUOUS
Status: DISCONTINUED | OUTPATIENT
Start: 2018-11-21 | End: 2018-11-21 | Stop reason: HOSPADM

## 2018-11-21 RX ORDER — PROPOFOL 10 MG/ML
VIAL (ML) INTRAVENOUS
Status: DISCONTINUED | OUTPATIENT
Start: 2018-11-21 | End: 2018-11-21

## 2018-11-21 RX ORDER — LIDOCAINE HYDROCHLORIDE 10 MG/ML
INJECTION INFILTRATION; PERINEURAL
Status: DISCONTINUED | OUTPATIENT
Start: 2018-11-21 | End: 2018-11-21

## 2018-11-21 RX ADMIN — PROPOFOL 20 MG: 10 INJECTION, EMULSION INTRAVENOUS at 07:11

## 2018-11-21 RX ADMIN — SODIUM CHLORIDE, SODIUM LACTATE, POTASSIUM CHLORIDE, AND CALCIUM CHLORIDE: .6; .31; .03; .02 INJECTION, SOLUTION INTRAVENOUS at 07:11

## 2018-11-21 RX ADMIN — LIDOCAINE HYDROCHLORIDE 50 MG: 10 INJECTION, SOLUTION INFILTRATION; PERINEURAL at 07:11

## 2018-11-21 RX ADMIN — PROPOFOL 80 MG: 10 INJECTION, EMULSION INTRAVENOUS at 07:11

## 2018-11-21 RX ADMIN — DIATRIZOATE MEGLUMINE AND DIATRIZOATE SODIUM 720 ML: 660; 100 LIQUID ORAL; RECTAL at 09:11

## 2018-11-21 NOTE — ANESTHESIA RELEASE NOTE
"Anesthesia Release from PACU Note    Patient: Josy Rehman    Procedure(s) Performed: Procedure(s) (LRB):  COLONOSCOPY (N/A)    Anesthesia type: MAC    Post pain: Adequate analgesia    Post assessment: no apparent anesthetic complications, tolerated procedure well and no evidence of recall    Last Vitals:   Visit Vitals  /76 (BP Location: Left arm, Patient Position: Lying)   Pulse 69   Temp 36.7 °C (98.1 °F) (Oral)   Resp 18   Ht 5' 2" (1.575 m)   Wt 51.7 kg (114 lb)   SpO2 98%   Breastfeeding? No   BMI 20.85 kg/m²       Post vital signs: stable    Level of consciousness: awake    Nausea/Vomiting: no nausea/no vomiting    Complications: none    Airway Patency: patent    Respiratory: unassisted, spontaneous ventilation, room air    Cardiovascular: stable and blood pressure at baseline    Hydration: euvolemic  "

## 2018-11-21 NOTE — OP NOTE
See my note in [employer].    Sigmoid colon was not traversed due to severe stricture in the mid sigmoid colon, likely due to diverticular disease. The stricture was about 3-5 mm in diameter.    Ethan Mock

## 2018-11-21 NOTE — [employer] PATIENT INSTRUCTIONS
Discharge Summary/Instructions after an Endoscopic Procedure  Patient Name: Josy Rehman  Patient MRN: [id number]  Patient YOB: 1950  Wednesday, November 21, 2018 Ethan Mock MD  RESTRICTIONS:  During your procedure today, you received medications for sedation.  These   medications may affect your judgment, balance and coordination.  Therefore,   for 24 hours, you have the following restrictions:   - DO NOT drive a car, operate machinery, make legal/financial decisions,   sign important papers or drink alcohol.    ACTIVITY:  Today: no heavy lifting, straining or running due to procedural   sedation/anesthesia.  The following day: return to full activity including work.  DIET:  Eat and drink normally unless instructed otherwise.     TREATMENT FOR COMMON SIDE EFFECTS:  - Mild abdominal pain, nausea, belching, bloating or excessive gas:  rest,   eat lightly and use a heating pad.  - Sore Throat: treat with throat lozenges and/or gargle with warm salt   water.  - Because air was used during the procedure, expelling large amounts of air   from your rectum or belching is normal.  - If a bowel prep was taken, you may not have a bowel movement for 1-3 days.    This is normal.  SYMPTOMS TO WATCH FOR AND REPORT TO YOUR PHYSICIAN:  1. Abdominal pain or bloating, other than gas cramps.  2. Chest pain.  3. Back pain.  4. Signs of infection such as: chills or fever occurring within 24 hours   after the procedure.  5. Rectal bleeding, which would show as bright red, maroon, or black stools.   (A tablespoon of blood from the rectum is not serious, especially if   hemorrhoids are present.)  6. Vomiting.  7. Weakness or dizziness.  GO DIRECTLY TO THE NEAREST EMERGENCY ROOM IF YOU HAVE ANY OF THE FOLLOWING:      Difficulty breathing              Chills and/or fever over 101 F   Persistent vomiting and/or vomiting blood   Severe abdominal pain   Severe chest pain   Black, tarry stools   Bleeding- more than one  tablespoon   Any other symptom or condition that you feel may need urgent attention  Your doctor recommends these additional instructions:  If any biopsies were taken, your doctors clinic will contact you in 1 to 2   weeks with any results.  - Perform a barium enema today.   - Discharge patient to home (ambulatory).   - Repeat colonoscopy in 10 years for screening purposes.  For questions, problems or results please call your physician Ethan Mock MD   at Work:  [phone]  If you have any questions about the above instructions, call the GI   department at [phone] or call the endoscopy unit at [phone]   from 7am until 3 pm.  [facility], EMERGENCY ROOM PHONE NUMBER:   [phone]  IF A COMPLICATION OR EMERGENCY SITUATION ARISES AND YOU ARE UNABLE TO REACH   YOUR PHYSICIAN - GO DIRECTLY TO THE EMERGENCY ROOM.  I have read or have had read to me these discharge instructions for my   procedure and have received a written copy.  I understand these   instructions and will follow-up with my physician if I have any questions.     __________________________________       _____________________________________  Nurse Signature                                          Patient/Designated   Responsible Party Signature  Ethan Mock MD  11/21/2018 7:37:23 AM  This report has been verified and signed electronically.  [employer]

## 2018-11-21 NOTE — TRANSFER OF CARE
"Anesthesia Transfer of Care Note    Patient: Josy Rehman    Procedure(s) Performed: Procedure(s) (LRB):  COLONOSCOPY (N/A)    Patient location: PACU    Anesthesia Type: MAC    Transport from OR: Transported from OR on room air with adequate spontaneous ventilation    Post pain: adequate analgesia    Post assessment: no apparent anesthetic complications and tolerated procedure well    Post vital signs: stable    Level of consciousness: awake    Nausea/Vomiting: no nausea/vomiting    Complications: none    Transfer of care protocol was followed      Last vitals:   Visit Vitals  /76 (BP Location: Left arm, Patient Position: Lying)   Pulse 69   Temp 36.7 °C (98.1 °F) (Oral)   Resp 18   Ht 5' 2" (1.575 m)   Wt 51.7 kg (114 lb)   SpO2 98%   Breastfeeding? No   BMI 20.85 kg/m²     "

## 2018-11-21 NOTE — H&P
Short Stay Endoscopy History and Physical    PCP - China Ortiz MD    Procedure - Screening colonoscopy 3rd. Average risk.  ASA - 1  Mallampati - per anesthesia  History of Anesthesia problems - no  Family history Anesthesia problems -  no     HPI:  This is a 68 y.o.female here for evaluation of : Screening colonoscopy 3rd. Average risk.    Reflux - no  Dysphagia - no  Abdominal pain - no  Diarrhea - no  Anemia - no  GI bleeding - no  Nausea and vomiting-no  Early satiety-no  aversion to sight or smell of food-no    ROS:  Constitutional: No fevers, chills, No weight loss  ENT: No allergies  CV: No chest pain  Pulm: No cough, No shortness of breath  Ophtho: No vision changes  GI: see HPI  Derm: No rash  Heme: No lymphadenopathy, No bruising  MSK: No arthritis  : No dysuria, No hematuria  Endo: No hot or cold intolerance  Neuro: No syncope, No seizure  Psych: No anxiety, No depression    Medical History:  Past Medical History:   Diagnosis Date    Anemia     Essential hypertension     Essential hypertension     Headache(784.0)     Hyperlipidemia     Osteoarthritis of right knee     Osteopenia     Pigmented skin lesion     Vertigo        Surgical History:  Past Surgical History:   Procedure Laterality Date    ENDOMETRIAL ABLATION         Family History:  Family History   Problem Relation Age of Onset    Diabetes Father     Hypertension Father     Breast cancer Mother     Cancer Mother     Breast cancer Daughter     Prostate cancer Brother     Diabetes Brother     Stomach cancer Brother     Lung cancer Brother     Fibrocystic breast disease Daughter     Breast cancer Sister     Ovarian cancer Sister        Social History:  Social History     Socioeconomic History    Marital status:      Spouse name: Not on file    Number of children: Not on file    Years of education: Not on file    Highest education level: Not on file   Social Needs    Financial resource strain: Not on file     Food insecurity - worry: Not on file    Food insecurity - inability: Not on file    Transportation needs - medical: Not on file    Transportation needs - non-medical: Not on file   Occupational History    Not on file   Tobacco Use    Smoking status: Never Smoker    Smokeless tobacco: Never Used   Substance and Sexual Activity    Alcohol use: No    Drug use: No    Sexual activity: Yes   Other Topics Concern    Not on file   Social History Narrative    The patient is , has two adult children and 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                Allergies:   Review of patient's allergies indicates:   Allergen Reactions    Penicillins      Other reaction(s): Unknown    Promethazine Itching       Medications:   No current facility-administered medications on file prior to encounter.      Current Outpatient Medications on File Prior to Encounter   Medication Sig Dispense Refill    calcium carbonate (OS-KHOI) 600 mg calcium (1,500 mg) Tab Take 600 mg by mouth 2 (two) times daily with meals.      ibuprofen (ADVIL,MOTRIN) 800 MG tablet Take 1 tablet (800 mg total) by mouth 3 (three) times daily as needed for Pain. 60 tablet 5    iron 18 mg Tab Take by mouth.      vitamin D (VITAMIN D3) 1000 units Tab Take 1,000 Units by mouth 2 (two) times daily.      ondansetron (ZOFRAN, AS HYDROCHLORIDE,) 4 MG tablet Take 1 tablet (4 mg total) by mouth every 12 (twelve) hours as needed for Nausea. 12 tablet 5       Objective Findings:    Vital Signs:  Vitals:    11/21/18 0658   BP: 127/76   Pulse: 69   Resp: 18   Temp: 98.1 °F (36.7 °C)           Physical Exam:  General Appearance: Well appearing in no acute distress  Eyes:    No scleral icterus  ENT: Neck supple, Lips, mucosa, and tongue normal; teeth and gums normal  Lungs: CTA bilaterally in anterior and posterior fields, no wheezes, no crackles.  Heart:  Regular rate, S1, S2 normal, no murmurs heard.  Abdomen: Soft, non tender, non distended with normal bowel  sounds. No hepatosplenomegaly, ascites, or mass.  Extremities: No clubbing, cyanosis or edema  Skin: No rash    Labs:  Reviewed    Plan:  I have explained the risks and benefits of endoscopy procedures to the patient including but not limited to bleeding, perforation, infection, and death. The patient wishes to proceed.     Ethan Mock

## 2018-11-21 NOTE — PLAN OF CARE
VSS, No GI bleed noted, discharge instructions provided to patient and family. Both verbalized understanding. Pt and family instructed to go to radiology waiting room for next procedure. Both verbalized understanding.

## 2018-11-21 NOTE — ANESTHESIA PREPROCEDURE EVALUATION
11/21/2018  Josy Rehman is a 68 y.o., female.    Anesthesia Evaluation    I have reviewed the Patient Summary Reports.    I have reviewed the Nursing Notes.   I have reviewed the Medications.     Review of Systems  Anesthesia Hx:  No problems with previous Anesthesia  Denies Family Hx of Anesthesia complications.   Denies Personal Hx of Anesthesia complications.   Social:  Non-Smoker, No Alcohol Use    Hematology/Oncology:     Oncology Normal    -- Anemia:   EENT/Dental:   vertigo   Cardiovascular:   Hypertension Denies MI.   Denies CABG/stent.      hyperlipidemia    Pulmonary:   Denies COPD.  Denies Asthma.  Denies Sleep Apnea.    Renal/:  Renal/ Normal     Hepatic/GI:   Bowel Prep. Denies GERD. Denies Liver Disease.  Denies Hepatitis.    Musculoskeletal:   Arthritis     Neurological:   Denies CVA. Headaches Denies Seizures.    Endocrine:  Endocrine Normal        Physical Exam  General:  Well nourished    Airway/Jaw/Neck:  Airway Findings: Mouth Opening: Normal Tongue: Normal  General Airway Assessment: Adult  Mallampati: II      Dental:  Dental Findings: In tact   Chest/Lungs:  Chest/Lungs Findings: Clear to auscultation, Normal Respiratory Rate     Heart/Vascular:  Heart Findings: Rate: Normal  Rhythm: Regular Rhythm  Sounds: Normal             Anesthesia Plan  Type of Anesthesia, risks & benefits discussed:  Anesthesia Type:  MAC  Patient's Preference:   Intra-op Monitoring Plan: standard ASA monitors  Intra-op Monitoring Plan Comments:   Post Op Pain Control Plan:   Post Op Pain Control Plan Comments:   Induction:   IV  Beta Blocker:  Patient is not currently on a Beta-Blocker (No further documentation required).       Informed Consent: Patient understands risks and agrees with Anesthesia plan.  Questions answered. Anesthesia consent signed with patient.  ASA Score: 2     Day of Surgery  Review of History & Physical: I have interviewed and examined the patient. I have reviewed the patient's H&P dated:  There are no significant changes.  H&P update referred to the surgeon.         Ready For Surgery From Anesthesia Perspective.

## 2018-11-21 NOTE — TELEPHONE ENCOUNTER
Called the number provided in regards to scheduling appt with Dr. Mock per , no ans left msg via vm to call back and call back number was provided.  11/21/18 11:37AM-pt returned call and during conversation shai was scheduled for 11/30/18 @ 1:40PM with Dr. Mock/YUE. Pt voiced an understanding

## 2018-11-21 NOTE — TELEPHONE ENCOUNTER
----- Message from Ethan Mock MD sent at 11/21/2018 11:00 AM CST -----  Please have this patient is scheduled to see me in the office as early as possible.  She is getting her CT scan done today. She has colon cancer and I will need to see her and then schedule her for surgery.    Ethan Mock

## 2018-11-21 NOTE — PLAN OF CARE
Pt wheeled to Radiology Department at this time with family. Pt took all personal belongings with her. TERESO

## 2018-11-21 NOTE — ANESTHESIA POSTPROCEDURE EVALUATION
"Anesthesia Post Evaluation    Patient: Josy Rehman    Procedure(s) Performed: Procedure(s) (LRB):  COLONOSCOPY (N/A)    Final Anesthesia Type: MAC  Patient location during evaluation: PACU  Patient participation: Yes- Able to Participate  Level of consciousness: awake  Post-procedure vital signs: reviewed and stable  Pain management: adequate  Airway patency: patent  PONV status at discharge: No PONV  Anesthetic complications: no      Cardiovascular status: blood pressure returned to baseline and hemodynamically stable  Respiratory status: unassisted, room air and spontaneous ventilation  Hydration status: euvolemic  Follow-up not needed.        Visit Vitals  /76 (BP Location: Left arm, Patient Position: Lying)   Pulse 69   Temp 36.7 °C (98.1 °F) (Oral)   Resp 18   Ht 5' 2" (1.575 m)   Wt 51.7 kg (114 lb)   SpO2 98%   Breastfeeding? No   BMI 20.85 kg/m²       Pain/Juan Score: Pain Assessment Performed: Yes (11/21/2018  6:56 AM)  Presence of Pain: denies (11/21/2018  6:56 AM)        "

## 2018-11-21 NOTE — DISCHARGE SUMMARY
[facility]  Short Stay  Discharge Summary    Admit Date: 11/21/2018    Discharge Date and Time: 11/21/2018      Discharge Attending Physician: Ethan Mock MD     Reason for Admission: Screening colonoscopy    Hospital Course (synopsis of major diagnoses, care, treatment, and services provided during the course of the hospital stay): Uneventful and full recovery    Final Diagnoses:    Principal Problem: Special screening for malignant neoplasms, colon   Secondary Diagnoses: Special screening for malignant neoplasms, colon    Procedures Performed: Procedure(s) (LRB):  COLONOSCOPY (N/A)      Discharged Condition: good    Disposition: Home or Self Care     Diet: regular.    Discharge Condition: Stable    Follow up/Patient Instructions:      Medications:      Medication List      CONTINUE taking these medications    calcium carbonate 600 mg calcium (1,500 mg) Tab  Commonly known as:  OS-KHOI     hydroCHLOROthiazide 12.5 mg capsule  Commonly known as:  MICROZIDE  TAKE 1 CAPSULE (12.5 MG TOTAL) BY MOUTH ONCE DAILY.     ibuprofen 800 MG tablet  Commonly known as:  ADVIL,MOTRIN  Take 1 tablet (800 mg total) by mouth 3 (three) times daily as needed for Pain.     iron 18 mg Tab     ondansetron 4 MG tablet  Commonly known as:  ZOFRAN  Take 1 tablet (4 mg total) by mouth every 12 (twelve) hours as needed for Nausea.     potassium chloride 10 MEQ Cpsr  Commonly known as:  MICRO-K  Take 1 capsule (10 mEq total) by mouth once daily.     vitamin D 1000 units Tab  Commonly known as:  VITAMIN D3           Where to Get Your Medications      These medications were sent to [facility]/pharm[facility] #[id number] - [place], [place] - [address]  [address], [place] [address]    Phone:  [phone]   · hydroCHLOROthiazide 12.5 mg capsule       Discharge Procedure Orders   FL Gastrografin Enema Water Soluble   Standing Status: Future Standing Exp. Date: 11/21/19     Order Specific Question Answer Comments   May the Radiologist modify the order per protocol  to meet the clinical needs of the patient? Yes      Diet general     Call MD for:  temperature >100.4     Call MD for:  persistent nausea and vomiting     Call MD for:  severe uncontrolled pain     Call MD for:  difficulty breathing, headache or visual disturbances     Call MD for:  redness, tenderness, or signs of infection (pain, swelling, redness, odor or green/yellow discharge around incision site)     Call MD for:  hives     Call MD for:  persistent dizziness or light-headedness     No dressing needed     Ethan Mock

## 2018-11-28 ENCOUNTER — HOSPITAL ENCOUNTER (OUTPATIENT)
Dept: RADIOLOGY | Facility: HOSPITAL | Age: 68
Discharge: HOME | End: 2018-11-28
Attending: SURGERY
Payer: MEDICARE

## 2018-11-28 DIAGNOSIS — C18.9 MALIGNANT NEOPLASM OF COLON, UNSPECIFIED PART OF COLON: ICD-10-CM

## 2018-11-28 PROCEDURE — 74178 CT ABD&PLV WO CNTR FLWD CNTR: CPT | Mod: TC

## 2018-11-28 PROCEDURE — 25500020 PHARM REV CODE 255: Performed by: SURGERY

## 2018-11-28 RX ADMIN — IOHEXOL 30 ML: 350 INJECTION, SOLUTION INTRAVENOUS at 07:11

## 2018-11-28 RX ADMIN — IOHEXOL 75 ML: 350 INJECTION, SOLUTION INTRAVENOUS at 10:11

## 2018-11-28 NOTE — PROGRESS NOTES
I am puzzled why the CT scan  that was done a week ago and is read today? Could you follow up on  this? I have to have some explanation for the family. Thanks.

## 2018-11-30 ENCOUNTER — OFFICE VISIT (OUTPATIENT)
Dept: SURGERY | Facility: CLINIC | Age: 68
End: 2018-11-30
Payer: MEDICARE

## 2018-11-30 VITALS
HEIGHT: 62 IN | SYSTOLIC BLOOD PRESSURE: 112 MMHG | HEART RATE: 62 BPM | TEMPERATURE: 99 F | WEIGHT: 114.19 LBS | DIASTOLIC BLOOD PRESSURE: 64 MMHG | BODY MASS INDEX: 21.02 KG/M2

## 2018-11-30 DIAGNOSIS — C18.7 MALIGNANT NEOPLASM OF SIGMOID COLON: Primary | ICD-10-CM

## 2018-11-30 PROCEDURE — 99214 OFFICE O/P EST MOD 30 MIN: CPT | Mod: S$PBB,,, | Performed by: SURGERY

## 2018-11-30 PROCEDURE — 99214 OFFICE O/P EST MOD 30 MIN: CPT | Mod: PBBFAC,PO | Performed by: SURGERY

## 2018-11-30 PROCEDURE — 99999 PR PBB SHADOW E&M-EST. PATIENT-LVL IV: CPT | Mod: PBBFAC,,, | Performed by: SURGERY

## 2018-11-30 RX ORDER — METRONIDAZOLE 500 MG/100ML
500 INJECTION, SOLUTION INTRAVENOUS
Status: CANCELLED | OUTPATIENT
Start: 2018-11-30

## 2018-11-30 RX ORDER — SODIUM CHLORIDE 9 MG/ML
INJECTION, SOLUTION INTRAVENOUS CONTINUOUS
Status: CANCELLED | OUTPATIENT
Start: 2018-11-30

## 2018-11-30 RX ORDER — LIDOCAINE HYDROCHLORIDE 10 MG/ML
1 INJECTION, SOLUTION EPIDURAL; INFILTRATION; INTRACAUDAL; PERINEURAL ONCE
Status: DISCONTINUED | OUTPATIENT
Start: 2018-11-30 | End: 2019-10-31

## 2018-11-30 NOTE — H&P
History & Physical    SUBJECTIVE:     History of Present Illness:  Patient is a 68 y.o. female presented for routine screening colonoscopy examination.  She was found to have severe stenosis in the mid sigmoid colon and had barium enema which confirmed a stricture with on apple core findings. She recently completed CT scan of the abdomen and pelvis with contrast and has been brought back to the office to discuss the findings as well as the treatment plan. She denies any family colon cancer history.  She denies any abdominal pain at this time.  The findings were consistent with colon cancer versus chronic diverticulosis.    Chief Complaint   Patient presents with    Follow-up     Discuss Sx       Review of patient's allergies indicates:   Allergen Reactions    Penicillins      Other reaction(s): Unknown    Promethazine Itching       Current Outpatient Medications   Medication Sig Dispense Refill    calcium carbonate (OS-KHOI) 600 mg calcium (1,500 mg) Tab Take 600 mg by mouth 2 (two) times daily with meals.      hydroCHLOROthiazide (MICROZIDE) 12.5 mg capsule TAKE 1 CAPSULE (12.5 MG TOTAL) BY MOUTH ONCE DAILY. 30 capsule 0    ibuprofen (ADVIL,MOTRIN) 800 MG tablet Take 1 tablet (800 mg total) by mouth 3 (three) times daily as needed for Pain. 60 tablet 5    iron 18 mg Tab Take by mouth.      ondansetron (ZOFRAN, AS HYDROCHLORIDE,) 4 MG tablet Take 1 tablet (4 mg total) by mouth every 12 (twelve) hours as needed for Nausea. 12 tablet 5    potassium chloride (MICRO-K) 10 MEQ CpSR Take 1 capsule (10 mEq total) by mouth once daily. 30 capsule 11    vitamin D (VITAMIN D3) 1000 units Tab Take 1,000 Units by mouth 2 (two) times daily.       Current Facility-Administered Medications   Medication Dose Route Frequency Provider Last Rate Last Dose    lidocaine (PF) 10 mg/ml (1%) injection 10 mg  1 mL Intradermal Once Ethan Mock MD           Past Medical History:   Diagnosis Date    Anemia     Essential hypertension  "    Essential hypertension     Headache(784.0)     Hyperlipidemia     Osteoarthritis of right knee     Osteopenia     Pigmented skin lesion     Vertigo      Past Surgical History:   Procedure Laterality Date    COLONOSCOPY N/A 11/21/2018    Procedure: COLONOSCOPY;  Surgeon: Ethan Mock MD;  Location: [facility] ENDO;  Service: Endoscopy;  Laterality: N/A;    COLONOSCOPY N/A 11/21/2018    Performed by Ethan Mock MD at [facility] ENDO    ENDOMETRIAL ABLATION       Family History   Problem Relation Age of Onset    Diabetes Father     Hypertension Father     Breast cancer Mother     Cancer Mother     Breast cancer Daughter     Prostate cancer Brother     Diabetes Brother     Stomach cancer Brother     Lung cancer Brother     Fibrocystic breast disease Daughter     Breast cancer Sister     Ovarian cancer Sister      Social History     Tobacco Use    Smoking status: Never Smoker    Smokeless tobacco: Never Used   Substance Use Topics    Alcohol use: No    Drug use: No        Review of Systems:  Review of Systems   Constitutional: Negative for chills and fever.   HENT: Negative for sore throat and trouble swallowing.    Eyes: Negative.    Respiratory: Negative for cough and shortness of breath.    Cardiovascular: Negative.    Gastrointestinal: Positive for abdominal distention. Negative for abdominal pain, nausea and vomiting.   Endocrine: Negative.    Genitourinary: Negative.    Musculoskeletal: Negative.    Skin: Negative.    Allergic/Immunologic: Negative.    Neurological: Negative.    Hematological: Does not bruise/bleed easily.   Psychiatric/Behavioral: Negative.        OBJECTIVE:     Vital Signs (Most Recent)  Temp: 98.7 °F (37.1 °C) (11/30/18 0911)  Pulse: 62 (11/30/18 0911)  BP: 112/64 (11/30/18 0911)  5' 2" (1.575 m)  51.8 kg (114 lb 3.2 oz)     Physical Exam:  Physical Exam   Constitutional: She is oriented to person, place, and time. She appears well-developed and well-nourished.   HENT:   Head: " Normocephalic.   Right Ear: External ear normal.   Left Ear: External ear normal.   Nose: Nose normal.   Eyes: Pupils are equal, round, and reactive to light. No scleral icterus.   Neck: Normal range of motion. Neck supple. No thyromegaly present.   Cardiovascular: Normal rate, regular rhythm and normal heart sounds.   No murmur heard.  Pulmonary/Chest: Effort normal and breath sounds normal.   Abdominal: Soft. Bowel sounds are normal. There is no tenderness. There is no guarding.   Musculoskeletal: Normal range of motion.   Lymphadenopathy:     She has no cervical adenopathy.   Neurological: She is alert and oriented to person, place, and time.   Skin: Skin is warm and dry.       Laboratory  Lab Results   Component Value Date    WBC 5.77 09/28/2018    HGB 12.2 09/28/2018    HCT 39.0 09/28/2018     09/28/2018    CHOL 198 09/28/2018    TRIG 75 09/28/2018    HDL 60 09/28/2018    ALT 17 09/28/2018    AST 32 09/28/2018     10/25/2018    K 3.5 10/25/2018     10/25/2018    CREATININE 1.2 11/28/2018    BUN 22 10/25/2018    CO2 24 10/25/2018    TSH 1.068 09/28/2018       Results for orders placed during the hospital encounter of 11/28/18   CT Abdomen Pelvis W Wo Contrast    Narrative EXAMINATION:  CT ABDOMEN PELVIS W WO CONTRAST    CLINICAL HISTORY:  Malignant neoplasm of colon, unspecified    TECHNIQUE:  Standard abdomen and pelvis CT protocol without and with IV contrast was performed.  75 mL of Omnipaque 350 contrast material was used for this examination.  Oral contrast was also used for this examination.    FINDINGS:  Finding: The size of the heart is within normal limits. The lungs are clear. There is no pneumothorax or pleural effusion.    The liver, gallbladder, pancreas, spleen, adrenals, and kidneys are normal in appearance. The ureters and the urinary bladder are normal in appearance.  The uterus and ovaries are normal in appearance.  The appendix is normal in appearance.  There is a moderate  amount of diverticulosis in the sigmoid portion of the colon.  There is a possible mass in the sigmoid portion of the colon.  There is no free fluid within the abdomen or pelvis. There is no pneumoperitoneum.  There is a mild amount of atherosclerosis.  There is no abnormal lymphadenopathy based on CT size criteria.      Impression 1. There is a moderate amount of diverticulosis in the sigmoid portion of the colon. There is a possible mass in the sigmoid portion of the colon.  This is consistent with the patient's history.  2. There is no abnormal lymphadenopathy based on CT size criteria.  All CT scans at this facility use dose modulation, iterative reconstruction, and/or weight base dosing when appropriate to reduce radiation dose when appropriate to reduce radiation dose to as low as reasonably achievable.      Electronically signed by: Luis A Gant MD  Date:    11/28/2018  Time:    10:39         Diagnostic Results:  Labs: Reviewed  ECG: Reviewed  X-Ray: Reviewed  CT: Reviewed  Gastrografin enema:  Reviewed    XR CHEST PA AND LATERAL    ASSESSMENT/PLAN:     Sigmoid colon stricture versus stenosis due to malignancy versus diverticular disease.    PLAN:Plan     The recommendation is proceed for colon resection on December 27, 2018 at 10:30 a.m..  The risk and the benefit of the procedure were fully addressed with the patient. The patient will undergo 2 days bowel prep prior to the procedure.    Ethan Mock

## 2018-11-30 NOTE — H&P (VIEW-ONLY)
History & Physical    SUBJECTIVE:     History of Present Illness:  Patient is a 68 y.o. female presented for routine screening colonoscopy examination.  She was found to have severe stenosis in the mid sigmoid colon and had barium enema which confirmed a stricture with on apple core findings. She recently completed CT scan of the abdomen and pelvis with contrast and has been brought back to the office to discuss the findings as well as the treatment plan. She denies any family colon cancer history.  She denies any abdominal pain at this time.  The findings were consistent with colon cancer versus chronic diverticulosis.    Chief Complaint   Patient presents with    Follow-up     Discuss Sx       Review of patient's allergies indicates:   Allergen Reactions    Penicillins      Other reaction(s): Unknown    Promethazine Itching       Current Outpatient Medications   Medication Sig Dispense Refill    calcium carbonate (OS-KHOI) 600 mg calcium (1,500 mg) Tab Take 600 mg by mouth 2 (two) times daily with meals.      hydroCHLOROthiazide (MICROZIDE) 12.5 mg capsule TAKE 1 CAPSULE (12.5 MG TOTAL) BY MOUTH ONCE DAILY. 30 capsule 0    ibuprofen (ADVIL,MOTRIN) 800 MG tablet Take 1 tablet (800 mg total) by mouth 3 (three) times daily as needed for Pain. 60 tablet 5    iron 18 mg Tab Take by mouth.      ondansetron (ZOFRAN, AS HYDROCHLORIDE,) 4 MG tablet Take 1 tablet (4 mg total) by mouth every 12 (twelve) hours as needed for Nausea. 12 tablet 5    potassium chloride (MICRO-K) 10 MEQ CpSR Take 1 capsule (10 mEq total) by mouth once daily. 30 capsule 11    vitamin D (VITAMIN D3) 1000 units Tab Take 1,000 Units by mouth 2 (two) times daily.       Current Facility-Administered Medications   Medication Dose Route Frequency Provider Last Rate Last Dose    lidocaine (PF) 10 mg/ml (1%) injection 10 mg  1 mL Intradermal Once Ethan Mock MD           Past Medical History:   Diagnosis Date    Anemia     Essential hypertension  "    Essential hypertension     Headache(784.0)     Hyperlipidemia     Osteoarthritis of right knee     Osteopenia     Pigmented skin lesion     Vertigo      Past Surgical History:   Procedure Laterality Date    COLONOSCOPY N/A 11/21/2018    Procedure: COLONOSCOPY;  Surgeon: Ethan Mock MD;  Location: [facility] ENDO;  Service: Endoscopy;  Laterality: N/A;    COLONOSCOPY N/A 11/21/2018    Performed by Ethan Mock MD at [facility] ENDO    ENDOMETRIAL ABLATION       Family History   Problem Relation Age of Onset    Diabetes Father     Hypertension Father     Breast cancer Mother     Cancer Mother     Breast cancer Daughter     Prostate cancer Brother     Diabetes Brother     Stomach cancer Brother     Lung cancer Brother     Fibrocystic breast disease Daughter     Breast cancer Sister     Ovarian cancer Sister      Social History     Tobacco Use    Smoking status: Never Smoker    Smokeless tobacco: Never Used   Substance Use Topics    Alcohol use: No    Drug use: No        Review of Systems:  Review of Systems   Constitutional: Negative for chills and fever.   HENT: Negative for sore throat and trouble swallowing.    Eyes: Negative.    Respiratory: Negative for cough and shortness of breath.    Cardiovascular: Negative.    Gastrointestinal: Positive for abdominal distention. Negative for abdominal pain, nausea and vomiting.   Endocrine: Negative.    Genitourinary: Negative.    Musculoskeletal: Negative.    Skin: Negative.    Allergic/Immunologic: Negative.    Neurological: Negative.    Hematological: Does not bruise/bleed easily.   Psychiatric/Behavioral: Negative.        OBJECTIVE:     Vital Signs (Most Recent)  Temp: 98.7 °F (37.1 °C) (11/30/18 0911)  Pulse: 62 (11/30/18 0911)  BP: 112/64 (11/30/18 0911)  5' 2" (1.575 m)  51.8 kg (114 lb 3.2 oz)     Physical Exam:  Physical Exam   Constitutional: She is oriented to person, place, and time. She appears well-developed and well-nourished.   HENT:   Head: " Normocephalic.   Right Ear: External ear normal.   Left Ear: External ear normal.   Nose: Nose normal.   Eyes: Pupils are equal, round, and reactive to light. No scleral icterus.   Neck: Normal range of motion. Neck supple. No thyromegaly present.   Cardiovascular: Normal rate, regular rhythm and normal heart sounds.   No murmur heard.  Pulmonary/Chest: Effort normal and breath sounds normal.   Abdominal: Soft. Bowel sounds are normal. There is no tenderness. There is no guarding.   Musculoskeletal: Normal range of motion.   Lymphadenopathy:     She has no cervical adenopathy.   Neurological: She is alert and oriented to person, place, and time.   Skin: Skin is warm and dry.       Laboratory  Lab Results   Component Value Date    WBC 5.77 09/28/2018    HGB 12.2 09/28/2018    HCT 39.0 09/28/2018     09/28/2018    CHOL 198 09/28/2018    TRIG 75 09/28/2018    HDL 60 09/28/2018    ALT 17 09/28/2018    AST 32 09/28/2018     10/25/2018    K 3.5 10/25/2018     10/25/2018    CREATININE 1.2 11/28/2018    BUN 22 10/25/2018    CO2 24 10/25/2018    TSH 1.068 09/28/2018       Results for orders placed during the hospital encounter of 11/28/18   CT Abdomen Pelvis W Wo Contrast    Narrative EXAMINATION:  CT ABDOMEN PELVIS W WO CONTRAST    CLINICAL HISTORY:  Malignant neoplasm of colon, unspecified    TECHNIQUE:  Standard abdomen and pelvis CT protocol without and with IV contrast was performed.  75 mL of Omnipaque 350 contrast material was used for this examination.  Oral contrast was also used for this examination.    FINDINGS:  Finding: The size of the heart is within normal limits. The lungs are clear. There is no pneumothorax or pleural effusion.    The liver, gallbladder, pancreas, spleen, adrenals, and kidneys are normal in appearance. The ureters and the urinary bladder are normal in appearance.  The uterus and ovaries are normal in appearance.  The appendix is normal in appearance.  There is a moderate  amount of diverticulosis in the sigmoid portion of the colon.  There is a possible mass in the sigmoid portion of the colon.  There is no free fluid within the abdomen or pelvis. There is no pneumoperitoneum.  There is a mild amount of atherosclerosis.  There is no abnormal lymphadenopathy based on CT size criteria.      Impression 1. There is a moderate amount of diverticulosis in the sigmoid portion of the colon. There is a possible mass in the sigmoid portion of the colon.  This is consistent with the patient's history.  2. There is no abnormal lymphadenopathy based on CT size criteria.  All CT scans at this facility use dose modulation, iterative reconstruction, and/or weight base dosing when appropriate to reduce radiation dose when appropriate to reduce radiation dose to as low as reasonably achievable.      Electronically signed by: Luis A Gant MD  Date:    11/28/2018  Time:    10:39         Diagnostic Results:  Labs: Reviewed  ECG: Reviewed  X-Ray: Reviewed  CT: Reviewed  Gastrografin enema:  Reviewed    XR CHEST PA AND LATERAL    ASSESSMENT/PLAN:     Sigmoid colon stricture versus stenosis due to malignancy versus diverticular disease.    PLAN:Plan     The recommendation is proceed for colon resection on December 27, 2018 at 10:30 a.m..  The risk and the benefit of the procedure were fully addressed with the patient. The patient will undergo 2 days bowel prep prior to the procedure.    Ethan Mock

## 2018-12-05 ENCOUNTER — CLINICAL SUPPORT (OUTPATIENT)
Dept: CARDIOLOGY | Facility: CLINIC | Age: 68
End: 2018-12-05
Payer: MEDICARE

## 2018-12-05 ENCOUNTER — HOSPITAL ENCOUNTER (OUTPATIENT)
Dept: RADIOLOGY | Facility: HOSPITAL | Age: 68
Discharge: HOME | End: 2018-12-05
Attending: SURGERY
Payer: MEDICARE

## 2018-12-05 DIAGNOSIS — C18.7 MALIGNANT NEOPLASM OF SIGMOID COLON: ICD-10-CM

## 2018-12-05 PROCEDURE — 71046 X-RAY EXAM CHEST 2 VIEWS: CPT | Mod: 26,,, | Performed by: RADIOLOGY

## 2018-12-05 PROCEDURE — 93005 ELECTROCARDIOGRAM TRACING: CPT | Mod: PBBFAC | Performed by: INTERNAL MEDICINE

## 2018-12-05 PROCEDURE — 93010 ELECTROCARDIOGRAM REPORT: CPT | Mod: S$PBB,,, | Performed by: INTERNAL MEDICINE

## 2018-12-05 PROCEDURE — 71046 X-RAY EXAM CHEST 2 VIEWS: CPT | Mod: TC

## 2018-12-10 RX ORDER — POTASSIUM CHLORIDE 750 MG/1
TABLET, EXTENDED RELEASE ORAL
Qty: 30 TABLET | Refills: 9 | Status: SHIPPED | OUTPATIENT
Start: 2018-12-10 | End: 2019-09-24 | Stop reason: SDUPTHER

## 2018-12-20 NOTE — PRE ADMISSION SCREENING
Pre op instructions reviewed with patient per phone:    To confirm, Your surgeon has instructed you:  Surgery is scheduled 12/27/2018 at 10:30 am.  Pre admit office to call afternoon prior to surgery with final arrival time.  If surgery is on Monday, Pre admit office to call Friday afternoon with with final arrival time      Please report to [facility] 1st floor main lobby by 9:00 am.      INSTRUCTIONS IMPORTANT!!!  ¨ No smoking after 12 midnight, the night before surgery.  ¨ No solid food after 12 midnight, but you may have clear liquids up until 3 hours prior to surgery.  This includes: grape, cranberry, and apple juice (not orange, and no coffee.)   ¨ OK to brush teeth, but no gum, candy or mints!    ¨ Take only these medicines with a small swallow of water-morning of surgery.  N/A    ____  Do not wear makeup, including mascara.  ____  No powder, lotions or creams to surgical area.  ____  Please remove all jewelry, including piercings and leave at home.  ____  No money or valuables needed. Please leave at home.  ____  Please bring identification and insurance information to hospital.  ____  If going home the same day, arrange for a ride home. You will not be able to   drive if Anesthesia was used.  ____  Children, under 12 years old, must remain in the waiting room with an adult.  They are not allowed in patient areas.  ____  Wear loose fitting clothing. Allow for dressings, bandages.  ____  Stop Aspirin, Ibuprofen, Motrin and Aleve at least 5-7 days before surgery, unless otherwise instructed by your doctor, or the nurse.   You MAY use Tylenol/acetaminophen until day of surgery.  ____  If you take diabetic medication, do not take am of surgery unless instructed by   Doctor.  ____ Stop taking any Fish Oil supplement or any Vitamins that contain Vitamin E at least 5 days prior to surgery.          Bathing Instructions-- The night before surgery and the morning prior to coming to the  hospital:   -Do not shave the surgical area.   -Shower and wash your hair and body as usual with your regular soap and shampoo.   -Rinse your hair and body completely.   -Use one packet of hibiclens to wash the surgical site (using your hand) gently for 5 minutes.  Do not scrub you skin too hard.   -Do not use hibiclens on your head, face, or genitals.   -Do not wash with regular soap after you use the hibiclens.   -Rinse your body thoroughly.   -Dry with clean, soft towel.  Do not use lotion, cream, deodorant, or powders on   the surgical site.    Use antibacterial soap in place of hibiclens if your surgery is on the head, face or genitals.         Surgical Site Infection    Prevention of surgical site infections:     -Keep incisions clean and dry.   -Do not soak/submerge incisions in water until completely healed.   -Do not apply lotions, powders, creams, or deodorants to site.   -Always make sure hands are cleaned with antibacterial soap/ alcohol-based   prior to touching the surgical site.  (This includes doctors, nurses, staff, and yourself.)    Signs and symptoms:   -Redness and pain around the area where you had surgery   -Drainage of cloudy fluid from your surgical wound   -Fever over 100.4  I have read or had read and explained to me, and understand the above information.

## 2018-12-26 ENCOUNTER — ANESTHESIA EVENT (OUTPATIENT)
Dept: SURGERY | Facility: HOSPITAL | Age: 68
DRG: 331 | End: 2018-12-26
Payer: MEDICARE

## 2018-12-27 ENCOUNTER — HOSPITAL ENCOUNTER (INPATIENT)
Facility: HOSPITAL | Age: 68
LOS: 2 days | Discharge: HOME | DRG: 331 | End: 2018-12-29
Attending: SURGERY | Admitting: SURGERY
Payer: MEDICARE

## 2018-12-27 ENCOUNTER — ANESTHESIA (OUTPATIENT)
Dept: SURGERY | Facility: HOSPITAL | Age: 68
DRG: 331 | End: 2018-12-27
Payer: MEDICARE

## 2018-12-27 DIAGNOSIS — I10 ESSENTIAL HYPERTENSION: Primary | ICD-10-CM

## 2018-12-27 DIAGNOSIS — E78.5 HYPERLIPIDEMIA, UNSPECIFIED HYPERLIPIDEMIA TYPE: ICD-10-CM

## 2018-12-27 DIAGNOSIS — C18.7 MALIGNANT NEOPLASM OF SIGMOID COLON: ICD-10-CM

## 2018-12-27 PROCEDURE — 25000003 PHARM REV CODE 250: Performed by: NURSE ANESTHETIST, CERTIFIED REGISTERED

## 2018-12-27 PROCEDURE — 44207 L COLECTOMY/COLOPROCTOSTOMY: CPT | Mod: 80,,, | Performed by: COLON & RECTAL SURGERY

## 2018-12-27 PROCEDURE — 11000001 HC ACUTE MED/SURG PRIVATE ROOM

## 2018-12-27 PROCEDURE — 25000003 PHARM REV CODE 250: Performed by: SURGERY

## 2018-12-27 PROCEDURE — 27201423 OPTIME MED/SURG SUP & DEVICES STERILE SUPPLY: Performed by: SURGERY

## 2018-12-27 PROCEDURE — 44207 PR LAP,SURG,COLECTOMY,W/ANAST: ICD-10-PCS | Mod: 80,,, | Performed by: COLON & RECTAL SURGERY

## 2018-12-27 PROCEDURE — 88307 TISSUE SPECIMEN TO PATHOLOGY - SURGERY: ICD-10-PCS | Mod: 26,,, | Performed by: PATHOLOGY

## 2018-12-27 PROCEDURE — 44207 PR LAP,SURG,COLECTOMY,W/ANAST: ICD-10-PCS | Mod: ,,, | Performed by: SURGERY

## 2018-12-27 PROCEDURE — 63600175 PHARM REV CODE 636 W HCPCS: Performed by: ANESTHESIOLOGY

## 2018-12-27 PROCEDURE — 36000713 HC OR TIME LEV V EA ADD 15 MIN: Performed by: SURGERY

## 2018-12-27 PROCEDURE — 88307 TISSUE EXAM BY PATHOLOGIST: CPT | Mod: 26,,, | Performed by: PATHOLOGY

## 2018-12-27 PROCEDURE — S0030 INJECTION, METRONIDAZOLE: HCPCS | Performed by: SURGERY

## 2018-12-27 PROCEDURE — 44207 L COLECTOMY/COLOPROCTOSTOMY: CPT | Mod: ,,, | Performed by: SURGERY

## 2018-12-27 PROCEDURE — 63600175 PHARM REV CODE 636 W HCPCS: Performed by: NURSE ANESTHETIST, CERTIFIED REGISTERED

## 2018-12-27 PROCEDURE — 99900035 HC TECH TIME PER 15 MIN (STAT)

## 2018-12-27 PROCEDURE — 88307 TISSUE EXAM BY PATHOLOGIST: CPT | Performed by: PATHOLOGY

## 2018-12-27 PROCEDURE — 36000712 HC OR TIME LEV V 1ST 15 MIN: Performed by: SURGERY

## 2018-12-27 PROCEDURE — 71000033 HC RECOVERY, INTIAL HOUR: Performed by: SURGERY

## 2018-12-27 PROCEDURE — 63600175 PHARM REV CODE 636 W HCPCS: Performed by: SURGERY

## 2018-12-27 PROCEDURE — 37000008 HC ANESTHESIA 1ST 15 MINUTES: Performed by: SURGERY

## 2018-12-27 PROCEDURE — 37000009 HC ANESTHESIA EA ADD 15 MINS: Performed by: SURGERY

## 2018-12-27 PROCEDURE — 94799 UNLISTED PULMONARY SVC/PX: CPT

## 2018-12-27 PROCEDURE — 71000039 HC RECOVERY, EACH ADD'L HOUR: Performed by: SURGERY

## 2018-12-27 RX ORDER — ROCURONIUM BROMIDE 10 MG/ML
INJECTION, SOLUTION INTRAVENOUS
Status: DISCONTINUED | OUTPATIENT
Start: 2018-12-27 | End: 2018-12-27

## 2018-12-27 RX ORDER — GLYCOPYRROLATE 0.2 MG/ML
INJECTION INTRAMUSCULAR; INTRAVENOUS
Status: DISCONTINUED | OUTPATIENT
Start: 2018-12-27 | End: 2018-12-27

## 2018-12-27 RX ORDER — ACETAMINOPHEN 325 MG/1
650 TABLET ORAL EVERY 6 HOURS PRN
Status: DISCONTINUED | OUTPATIENT
Start: 2018-12-27 | End: 2018-12-29 | Stop reason: HOSPADM

## 2018-12-27 RX ORDER — CIPROFLOXACIN 2 MG/ML
400 INJECTION, SOLUTION INTRAVENOUS
Status: COMPLETED | OUTPATIENT
Start: 2018-12-27 | End: 2018-12-27

## 2018-12-27 RX ORDER — METRONIDAZOLE 500 MG/100ML
500 INJECTION, SOLUTION INTRAVENOUS
Status: COMPLETED | OUTPATIENT
Start: 2018-12-27 | End: 2018-12-27

## 2018-12-27 RX ORDER — HYDROMORPHONE HYDROCHLORIDE 2 MG/ML
0.2 INJECTION, SOLUTION INTRAMUSCULAR; INTRAVENOUS; SUBCUTANEOUS EVERY 5 MIN PRN
Status: DISCONTINUED | OUTPATIENT
Start: 2018-12-27 | End: 2018-12-27

## 2018-12-27 RX ORDER — CLONIDINE HYDROCHLORIDE 0.1 MG/1
0.1 TABLET ORAL EVERY 6 HOURS PRN
Status: DISCONTINUED | OUTPATIENT
Start: 2018-12-27 | End: 2018-12-29 | Stop reason: HOSPADM

## 2018-12-27 RX ORDER — BISACODYL 10 MG
10 SUPPOSITORY, RECTAL RECTAL DAILY PRN
Status: DISCONTINUED | OUTPATIENT
Start: 2018-12-27 | End: 2018-12-29 | Stop reason: HOSPADM

## 2018-12-27 RX ORDER — SODIUM CHLORIDE 0.9 % (FLUSH) 0.9 %
3 SYRINGE (ML) INJECTION
Status: DISCONTINUED | OUTPATIENT
Start: 2018-12-27 | End: 2018-12-27

## 2018-12-27 RX ORDER — NEOSTIGMINE METHYLSULFATE 1 MG/ML
INJECTION, SOLUTION INTRAVENOUS
Status: DISCONTINUED | OUTPATIENT
Start: 2018-12-27 | End: 2018-12-27

## 2018-12-27 RX ORDER — HYDROCHLOROTHIAZIDE 12.5 MG/1
12.5 CAPSULE ORAL DAILY
Qty: 30 CAPSULE | Refills: 0 | Status: SHIPPED | OUTPATIENT
Start: 2018-12-27 | End: 2019-02-11 | Stop reason: SDUPTHER

## 2018-12-27 RX ORDER — DIPHENHYDRAMINE HYDROCHLORIDE 50 MG/ML
25 INJECTION INTRAMUSCULAR; INTRAVENOUS EVERY 4 HOURS PRN
Status: DISCONTINUED | OUTPATIENT
Start: 2018-12-27 | End: 2018-12-27 | Stop reason: RX

## 2018-12-27 RX ORDER — HYDROCODONE BITARTRATE AND ACETAMINOPHEN 5; 325 MG/1; MG/1
1 TABLET ORAL EVERY 4 HOURS PRN
Status: DISCONTINUED | OUTPATIENT
Start: 2018-12-27 | End: 2018-12-29

## 2018-12-27 RX ORDER — SODIUM CHLORIDE, SODIUM LACTATE, POTASSIUM CHLORIDE, CALCIUM CHLORIDE 600; 310; 30; 20 MG/100ML; MG/100ML; MG/100ML; MG/100ML
INJECTION, SOLUTION INTRAVENOUS CONTINUOUS PRN
Status: DISCONTINUED | OUTPATIENT
Start: 2018-12-27 | End: 2018-12-27

## 2018-12-27 RX ORDER — CHLORHEXIDINE GLUCONATE ORAL RINSE 1.2 MG/ML
10 SOLUTION DENTAL 2 TIMES DAILY
Status: DISCONTINUED | OUTPATIENT
Start: 2018-12-27 | End: 2018-12-29 | Stop reason: HOSPADM

## 2018-12-27 RX ORDER — FENTANYL CITRATE 50 UG/ML
INJECTION, SOLUTION INTRAMUSCULAR; INTRAVENOUS
Status: DISCONTINUED | OUTPATIENT
Start: 2018-12-27 | End: 2018-12-27

## 2018-12-27 RX ORDER — LIDOCAINE HYDROCHLORIDE 10 MG/ML
1 INJECTION, SOLUTION EPIDURAL; INFILTRATION; INTRACAUDAL; PERINEURAL ONCE
Status: DISCONTINUED | OUTPATIENT
Start: 2018-12-27 | End: 2018-12-27

## 2018-12-27 RX ORDER — CIPROFLOXACIN 2 MG/ML
400 INJECTION, SOLUTION INTRAVENOUS
Status: DISCONTINUED | OUTPATIENT
Start: 2018-12-27 | End: 2018-12-29 | Stop reason: HOSPADM

## 2018-12-27 RX ORDER — BUPIVACAINE HYDROCHLORIDE 2.5 MG/ML
INJECTION, SOLUTION EPIDURAL; INFILTRATION; INTRACAUDAL
Status: DISCONTINUED | OUTPATIENT
Start: 2018-12-27 | End: 2018-12-27 | Stop reason: HOSPADM

## 2018-12-27 RX ORDER — SODIUM CHLORIDE 9 MG/ML
INJECTION, SOLUTION INTRAVENOUS CONTINUOUS
Status: DISCONTINUED | OUTPATIENT
Start: 2018-12-27 | End: 2018-12-27

## 2018-12-27 RX ORDER — LACTULOSE 10 G/15ML
20 SOLUTION ORAL EVERY 6 HOURS PRN
Status: DISCONTINUED | OUTPATIENT
Start: 2018-12-27 | End: 2018-12-29 | Stop reason: HOSPADM

## 2018-12-27 RX ORDER — RAMELTEON 8 MG/1
8 TABLET ORAL NIGHTLY PRN
Status: DISCONTINUED | OUTPATIENT
Start: 2018-12-27 | End: 2018-12-29 | Stop reason: HOSPADM

## 2018-12-27 RX ORDER — MIDAZOLAM HYDROCHLORIDE 1 MG/ML
INJECTION, SOLUTION INTRAMUSCULAR; INTRAVENOUS
Status: DISCONTINUED | OUTPATIENT
Start: 2018-12-27 | End: 2018-12-27

## 2018-12-27 RX ORDER — SODIUM CHLORIDE, SODIUM LACTATE, POTASSIUM CHLORIDE, CALCIUM CHLORIDE 600; 310; 30; 20 MG/100ML; MG/100ML; MG/100ML; MG/100ML
INJECTION, SOLUTION INTRAVENOUS CONTINUOUS
Status: DISCONTINUED | OUTPATIENT
Start: 2018-12-27 | End: 2018-12-29 | Stop reason: HOSPADM

## 2018-12-27 RX ORDER — AMOXICILLIN 250 MG
1 CAPSULE ORAL 2 TIMES DAILY
Status: DISCONTINUED | OUTPATIENT
Start: 2018-12-27 | End: 2018-12-29 | Stop reason: HOSPADM

## 2018-12-27 RX ORDER — PROPOFOL 10 MG/ML
VIAL (ML) INTRAVENOUS
Status: DISCONTINUED | OUTPATIENT
Start: 2018-12-27 | End: 2018-12-27

## 2018-12-27 RX ORDER — MEPERIDINE HYDROCHLORIDE 50 MG/ML
12.5 INJECTION INTRAMUSCULAR; INTRAVENOUS; SUBCUTANEOUS ONCE AS NEEDED
Status: DISCONTINUED | OUTPATIENT
Start: 2018-12-27 | End: 2018-12-27

## 2018-12-27 RX ORDER — FENTANYL CITRATE 50 UG/ML
25 INJECTION, SOLUTION INTRAMUSCULAR; INTRAVENOUS EVERY 5 MIN PRN
Status: DISCONTINUED | OUTPATIENT
Start: 2018-12-27 | End: 2018-12-27

## 2018-12-27 RX ORDER — LABETALOL HYDROCHLORIDE 5 MG/ML
INJECTION, SOLUTION INTRAVENOUS
Status: DISCONTINUED | OUTPATIENT
Start: 2018-12-27 | End: 2018-12-27

## 2018-12-27 RX ORDER — LIDOCAINE HYDROCHLORIDE 10 MG/ML
INJECTION INFILTRATION; PERINEURAL
Status: DISCONTINUED | OUTPATIENT
Start: 2018-12-27 | End: 2018-12-27

## 2018-12-27 RX ORDER — METRONIDAZOLE 500 MG/100ML
500 INJECTION, SOLUTION INTRAVENOUS
Status: DISCONTINUED | OUTPATIENT
Start: 2018-12-27 | End: 2018-12-29 | Stop reason: HOSPADM

## 2018-12-27 RX ORDER — ONDANSETRON 8 MG/1
8 TABLET, ORALLY DISINTEGRATING ORAL EVERY 8 HOURS PRN
Status: DISCONTINUED | OUTPATIENT
Start: 2018-12-27 | End: 2018-12-29 | Stop reason: HOSPADM

## 2018-12-27 RX ORDER — DEXAMETHASONE SODIUM PHOSPHATE 4 MG/ML
INJECTION, SOLUTION INTRA-ARTICULAR; INTRALESIONAL; INTRAMUSCULAR; INTRAVENOUS; SOFT TISSUE
Status: DISCONTINUED | OUTPATIENT
Start: 2018-12-27 | End: 2018-12-27

## 2018-12-27 RX ORDER — HYDROMORPHONE HYDROCHLORIDE 2 MG/ML
1 INJECTION, SOLUTION INTRAMUSCULAR; INTRAVENOUS; SUBCUTANEOUS
Status: DISCONTINUED | OUTPATIENT
Start: 2018-12-27 | End: 2018-12-29 | Stop reason: HOSPADM

## 2018-12-27 RX ORDER — ONDANSETRON 2 MG/ML
INJECTION INTRAMUSCULAR; INTRAVENOUS
Status: DISCONTINUED | OUTPATIENT
Start: 2018-12-27 | End: 2018-12-27

## 2018-12-27 RX ADMIN — LABETALOL HYDROCHLORIDE 10 MG: 5 INJECTION, SOLUTION INTRAVENOUS at 08:12

## 2018-12-27 RX ADMIN — FENTANYL CITRATE 50 MCG: 50 INJECTION, SOLUTION INTRAMUSCULAR; INTRAVENOUS at 07:12

## 2018-12-27 RX ADMIN — SODIUM CHLORIDE, SODIUM LACTATE, POTASSIUM CHLORIDE, AND CALCIUM CHLORIDE: 600; 310; 30; 20 INJECTION, SOLUTION INTRAVENOUS at 11:12

## 2018-12-27 RX ADMIN — NEOSTIGMINE METHYLSULFATE 2.5 MG: 1 INJECTION INTRAVENOUS at 11:12

## 2018-12-27 RX ADMIN — LIDOCAINE HYDROCHLORIDE 50 MG: 10 INJECTION, SOLUTION INFILTRATION; PERINEURAL at 07:12

## 2018-12-27 RX ADMIN — FENTANYL CITRATE 50 MCG: 50 INJECTION, SOLUTION INTRAMUSCULAR; INTRAVENOUS at 08:12

## 2018-12-27 RX ADMIN — SODIUM CHLORIDE, SODIUM LACTATE, POTASSIUM CHLORIDE, AND CALCIUM CHLORIDE: .6; .31; .03; .02 INJECTION, SOLUTION INTRAVENOUS at 01:12

## 2018-12-27 RX ADMIN — ACETAMINOPHEN 650 MG: 325 TABLET ORAL at 10:12

## 2018-12-27 RX ADMIN — STANDARDIZED SENNA CONCENTRATE AND DOCUSATE SODIUM 1 TABLET: 8.6; 5 TABLET, FILM COATED ORAL at 03:12

## 2018-12-27 RX ADMIN — ROCURONIUM BROMIDE 10 MG: 10 INJECTION, SOLUTION INTRAVENOUS at 10:12

## 2018-12-27 RX ADMIN — METRONIDAZOLE 500 MG: 500 INJECTION, SOLUTION INTRAVENOUS at 03:12

## 2018-12-27 RX ADMIN — STANDARDIZED SENNA CONCENTRATE AND DOCUSATE SODIUM 1 TABLET: 8.6; 5 TABLET, FILM COATED ORAL at 09:12

## 2018-12-27 RX ADMIN — RAMELTEON 8 MG: 8 TABLET, FILM COATED ORAL at 09:12

## 2018-12-27 RX ADMIN — ROCURONIUM BROMIDE 10 MG: 10 INJECTION, SOLUTION INTRAVENOUS at 09:12

## 2018-12-27 RX ADMIN — DEXAMETHASONE SODIUM PHOSPHATE 4 MG: 4 INJECTION, SOLUTION INTRA-ARTICULAR; INTRALESIONAL; INTRAMUSCULAR; INTRAVENOUS; SOFT TISSUE at 07:12

## 2018-12-27 RX ADMIN — PROPOFOL 50 MG: 10 INJECTION, EMULSION INTRAVENOUS at 07:12

## 2018-12-27 RX ADMIN — METRONIDAZOLE 500 MG: 500 SOLUTION INTRAVENOUS at 07:12

## 2018-12-27 RX ADMIN — CIPROFLOXACIN 400 MG: 2 INJECTION INTRAVENOUS at 07:12

## 2018-12-27 RX ADMIN — LABETALOL HYDROCHLORIDE 10 MG: 5 INJECTION, SOLUTION INTRAVENOUS at 10:12

## 2018-12-27 RX ADMIN — MIDAZOLAM 2 MG: 1 INJECTION INTRAMUSCULAR; INTRAVENOUS at 07:12

## 2018-12-27 RX ADMIN — ROBINUL 0.6 MG: 0.2 INJECTION INTRAMUSCULAR; INTRAVENOUS at 11:12

## 2018-12-27 RX ADMIN — SODIUM CHLORIDE, SODIUM LACTATE, POTASSIUM CHLORIDE, AND CALCIUM CHLORIDE: 600; 310; 30; 20 INJECTION, SOLUTION INTRAVENOUS at 09:12

## 2018-12-27 RX ADMIN — CHLORHEXIDINE GLUCONATE 10 ML: 1.2 RINSE ORAL at 09:12

## 2018-12-27 RX ADMIN — CIPROFLOXACIN 400 MG: 2 INJECTION, SOLUTION INTRAVENOUS at 06:12

## 2018-12-27 RX ADMIN — ROCURONIUM BROMIDE 30 MG: 10 INJECTION, SOLUTION INTRAVENOUS at 07:12

## 2018-12-27 RX ADMIN — HYDROCODONE BITARTRATE AND ACETAMINOPHEN 1 TABLET: 5; 325 TABLET ORAL at 05:12

## 2018-12-27 RX ADMIN — ROCURONIUM BROMIDE 20 MG: 10 INJECTION, SOLUTION INTRAVENOUS at 08:12

## 2018-12-27 RX ADMIN — ONDANSETRON 4 MG: 2 INJECTION, SOLUTION INTRAMUSCULAR; INTRAVENOUS at 11:12

## 2018-12-27 RX ADMIN — METRONIDAZOLE 500 MG: 500 INJECTION, SOLUTION INTRAVENOUS at 09:12

## 2018-12-27 RX ADMIN — SODIUM CHLORIDE, SODIUM LACTATE, POTASSIUM CHLORIDE, AND CALCIUM CHLORIDE: 600; 310; 30; 20 INJECTION, SOLUTION INTRAVENOUS at 07:12

## 2018-12-27 RX ADMIN — FENTANYL CITRATE 25 MCG: 50 INJECTION INTRAMUSCULAR; INTRAVENOUS at 12:12

## 2018-12-27 NOTE — INTERVAL H&P NOTE
The patient has been examined and the H&P has been reviewed:    I concur with the findings and no changes have occurred since H&P was written.    Anesthesia/Surgery risks, benefits and alternative options discussed and understood by patient/family.          Active Hospital Problems    Diagnosis  POA    Malignant neoplasm of sigmoid colon [C18.7]  Yes      Resolved Hospital Problems   No resolved problems to display.

## 2018-12-27 NOTE — PLAN OF CARE
Problem: Adult Inpatient Plan of Care  Goal: Plan of Care Review  Outcome: Ongoing (interventions implemented as appropriate)  POC reviewed with patient and family. Patient and family verbalized understanding. Fall precautions maintained. Patient remains free from falls/injuries. Intermittent complaints of pain. Refused pain medication at this time. Instructed to ambulate often with assist. Catheter care performed. CAUTI precautions maintained. Peripheral IV maintained with IVF and IV abx. Dressing to incisions dry, clean, and intact. VSS. Safety measures in place. NADN. Chart check complete.

## 2018-12-27 NOTE — TRANSFER OF CARE
"Anesthesia Transfer of Care Note    Patient: Josy Rehman    Procedure(s) Performed: Procedure(s) (LRB):  XI ROBOTIC COLECTOMY, SIGMOID (N/A)    Patient location: PACU    Anesthesia Type: general    Transport from OR: Transported from OR on room air with adequate spontaneous ventilation    Post pain: adequate analgesia    Post assessment: no apparent anesthetic complications    Post vital signs: stable    Level of consciousness: awake    Nausea/Vomiting: no nausea/vomiting    Complications: none    Transfer of care protocol was followed      Last vitals:   Visit Vitals  BP (!) 150/66 (BP Location: Right arm, Patient Position: Sitting)   Pulse 66   Temp 36.5 °C (97.7 °F) (Temporal)   Resp 18   Ht 5' 2" (1.575 m)   Wt 50.1 kg (110 lb 7.2 oz)   SpO2 99%   Breastfeeding? No   BMI 20.20 kg/m²     "

## 2018-12-27 NOTE — ANESTHESIA POSTPROCEDURE EVALUATION
"Anesthesia Post Evaluation    Patient: Josy Rehman    Procedure(s) Performed: Procedure(s) (LRB):  XI ROBOTIC COLECTOMY, SIGMOID (N/A)    Final Anesthesia Type: general  Patient location during evaluation: PACU  Patient participation: Yes- Able to Participate  Level of consciousness: awake and alert and oriented  Post-procedure vital signs: reviewed and stable  Pain management: adequate  Airway patency: patent  PONV status at discharge: No PONV  Anesthetic complications: no      Cardiovascular status: blood pressure returned to baseline  Respiratory status: unassisted  Hydration status: euvolemic  Follow-up not needed.        Visit Vitals  BP (!) 162/75 (BP Location: Right arm, Patient Position: Lying)   Pulse 67   Temp 36.5 °C (97.7 °F) (Axillary)   Resp 14   Ht 5' 2" (1.575 m)   Wt 50.1 kg (110 lb 7.2 oz)   SpO2 97%   Breastfeeding? No   BMI 20.20 kg/m²       Pain/Juan Score: Pain Rating Prior to Med Admin: 7 (12/27/2018 12:30 PM)  Juan Score: 8 (12/27/2018 12:30 PM)        "

## 2018-12-27 NOTE — OP NOTE
"Operative Note       SURGERY DATE:  12/27/2018    PRE-OP DIAGNOSIS:  Malignant neoplasm of sigmoid colon [C18.7]    POST-OP DIAGNOSIS:  Malignant neoplasm of sigmoid colon [C18.7]   Active Hospital Problems    Diagnosis  POA    Malignant neoplasm of sigmoid colon [C18.7]  Yes      Resolved Hospital Problems   No resolved problems to display.       Procedure(s) (LRB):  XI ROBOTIC COLECTOMY, SIGMOID (N/A)    Surgeon(s) and Role:     * Ethan Mock MD - Primary     * Reymundo Whitt MD - Assisting    ASSISTANTS: None  ANESTHESIA: General    FINDINGS:  The disease the segment of the sigmoid colon on the distal aspect was very short segment.  The cut surface of the resected disease segment revealed stricture due to chronic diverticulitis.  The disease segment was adherent to the left adnexa.    ESTIMATED BLOOD LOSS: 10 mL              COMPLICATIONS:  None    SPECIMEN:  Disease the sigmoid colon, rectum, and anastomotic rings    Implants: None    INDICATION:  Sigmoid colonic stricture with obstruction.    DESCRIPTION OF PROCEDURE:    The patient was taken to the operating room and underwent general anesthesia with orotracheal intubation. The patient was then placed on anti-slip pink pad, and tucked his arms. Then the patient was placed in a lithotomy position. A peres catheter was inserted and the abdomen was prepared in the usual fashion. Once the patient was sleep, a "time-out" was called, the patient's ID, the site of surgery, the names of participants, and the planned surgery were confirmed prior to making the incision. A Veress needle was inserted on the right upper quadrant and achieved pneumoperitoneum to the pressure of 15 mm Hg. Then 12 mm Robotic metal trocar on the right lower quadrant, 2 finger breadth above the anterior superior iliac supine, then 8 mm trocars, three in total along the oblique line toward the epigastric area but the last port was not crossed the midline. A 5 mm trocar was secured between " 3rd and 4th trocars in triangulation for the assistant. Then the patient was placed in steep trelendelenburg with the patient airplaned toward the right side. Then the robot was docked from the right lower aspect of the patient. The medial to lateral approach was then from the pubic promontorium, and dissected into the presacral space, then the mesorectum was cleared below the rectosigmoid junction. Then mesentery of sigmoid colon was dissected from the medial to lateral approach as well until the left ureter was identified and preserved. The lateral dissection off the line of Toldt along the left paracolic gutter all the way above the splenic flexure with complete take down. Then robotic staple with vascular loads was used to staple off the superior hemorrhoidal vessels, then dissection was marched along the mesentery root using robotic vessel sealer. The inferior mesenteric artery, and superior hemorrhoidal vessel were encountered and divided with a vascular load. Then dissection continued up to the healthy portion of the descending colon. The proximal margin was cleared of mesentery, and marked for later division. The distal margin of the rectosigmoid junction was also cleared off all mesentery, then divided with robotic staple with green load. The robot was undocked. Then a low mid line incision was made and delivered the sigmoid colon. The diseased part was removed and placed an anvil of 25 mm CEEA, and dropped into the peritoneal cavity. The wound protector was covered with a glove to establish pneumoperitoneum, and mobilized the mesentery attachment off the retroperitoneum to bring the anvil down to the rectum staple line. Then CEEA 25 mm was then advanced from the below, and the spike was delivered under visualization, then the anvil from above was joined and fired. The donuts were identified then the proctoscopy was done to test the air tightness of the anastomosis. Then irrigation was done, and laurenidline  incision was closed after injection with a mixture of Exparel, 0.25% Marcaine total of 80 ml. Then the wound was closed with skin staples, and the right lower quadrant 12 mm incision was closed with 0 Vicryl at the fascia level. The patient was later awakened, and taken to the recovery room. The EBL was about 50 ml, and the patient was good.            CONDITION: Good    DISPOSITION: PACU - hemodynamically stable.     Ethan Mock

## 2018-12-27 NOTE — ANESTHESIA PREPROCEDURE EVALUATION
"                                                                                                             12/27/2018  Josy Rehman is a 68 y.o., female.  Josy Rehman [[id number]] - 68 y.o. Female     Procedure Summary     Case: [id number] Date/Time: 12/27/18 0730   Procedure: XI ROBOTIC COLECTOMY, SIGMOID (N/A )   Anesthesia type: General   Diagnosis: Malignant neoplasm of sigmoid colon [C18.7]   Pre-op diagnosis: Malignant neoplasm of sigmoid colon [C18.7]   Location: [facility] OR 03 / [facility] OR   Surgeon: Ethan Mock MD   Pre Vitals        BP: 150/66 Pulse: 66   Resp: 18 SpO2: 99   Temp: 36.5 °C (97.7 °F)   Height: 5' 2" (1.575 m)  (12/27/18) Weight: 50.1 kg (110 lb 7.2 oz)  (12/27/18)   BMI: 20.2 IBW: 50.1 kg (110 lb 7.8 oz)   Last edited 12/27/18 0641 by AB   Allergies        PENICILLINS, PROMETHAZINE   Facility Administered Medications     0.9%  NaCl infusion    bupivacaine liposome (PF) 1.3 % (13.3 mg/mL) suspension 266 mg      ciprofloxacin (CIPRO)400mg/200ml D5W IVPB 400 mg   Linked Group 1: "And" Linked Group Details    lidocaine (PF) 10 mg/ml (1%) injection 10 mg      metronidazole IVPB 500 mg   Linked Group 1: "And" Linked Group Details    nozaseptin (NOZIN) nasal       Prescription Medications      Last Taken Last Updated   calcium carbonate (OS-KHOI) 600 mg calcium (1,500 mg) Tab    12/20/2018 12/20/18 1502   hydroCHLOROthiazide (MICROZIDE) 12.5 mg capsule    12/20/2018 12/20/18 1502   ibuprofen (ADVIL,MOTRIN) 800 MG tablet    Past Month 12/20/18 1502   iron 18 mg Tab    12/20/2018 12/20/18 1502   ondansetron (ZOFRAN, AS HYDROCHLORIDE,) 4 MG tablet    More than a month 12/20/18 1502   potassium chloride SA (K-DUR,KLOR-CON) 10 MEQ tablet    12/20/2018 12/20/18 1502   vitamin D (VITAMIN D3) 1000 units Tab    12/20/2018 12/20/18 1502   OR Nursing Notes     No notes found.   Airway LDAs (Previous Encounter)     Date Procedure Department Responsible Provider   11/21/18 COLONOSCOPY (N/A ) [facility] - MILLIE Ash MD    View all records in Chart Review     Josy Rehman [[id number]] - 68 y.o. Female     Anesthesia History        PONV (postoperative nausea and vomiting) Hyperlipidemia   Vertigo Headache(784.0)   Osteoarthritis of right knee Pigmented skin lesion   Osteopenia Essential hypertension   Anemia Essential hypertension   Heart murmur    Surgical History        ENDOMETRIAL ABLATION COLONOSCOPY   Substance History        Smoking Status: Never Smoker   Smokeless Tobacco Status: Never Used   Alcohol use: No   Drug use: No   Problem List        Hyperlipemia   Vertigo   Osteoarthritis of both knees   Osteopenia   Essential hypertension   Vitamin D deficiency disease   Special screening for malignant neoplasms, colon   Malignant neoplasm of sigmoid colon     Josy Rehman [[id number]] - 68 y.o. Female     Liver      (Up to last 3 results from the past 168 hours)   None   Hematology      (Up to last 3 results from the past 72 hours)   None   Chemistry      (Up to last 3 results from the past 168 hours)   None   Blood Gas      (Up to last 3 results from the past 72 hours)   None   Coagulation      (Up to last 3 results from the past 72 hours)   None   Blood Orders   Ordered in last 7 days   No blood orders found   Results Last 90 Days      (Up to last 4 results from the past 2180 hours)    10/25 0816 11/28 0628 12/05 0821   HGB --     --     12.0       WBC --     --     5.75       Platelets --     --     301            --     140       K+ 3.5     --     3.6       Creatinine 1.1     1.2     1.2       Glucose 81     --     86        09/28 1020     HGB 12.2         WBC 5.77         Platelets 322                  K+ 2.9 Abnormally low          Creatinine 1.1         Glucose 88             Pre-op Assessment     I have reviewed the Nursing Notes.      Review of Systems  Anesthesia Hx:  Hx of Anesthetic complications    Hematology/Oncology:  Hematology Normal         Cardiovascular:   Hypertension    Renal/:  Renal/ Normal     Hepatic/GI:  Hepatic/GI Normal    Musculoskeletal:   Arthritis     Neurological:   Headaches    Endocrine:  Endocrine Normal        Physical Exam  General:  Well nourished       Chest/Lungs:  Chest/Lungs Findings: Clear to auscultation     Heart/Vascular:  Heart Findings: Rate: Normal  Rhythm: Regular Rhythm             Anesthesia Plan  Type of Anesthesia, risks & benefits discussed:  Anesthesia Type:  general  Patient's Preference:   Intra-op Monitoring Plan: standard ASA monitors  Intra-op Monitoring Plan Comments:   Post Op Pain Control Plan: multimodal analgesia  Post Op Pain Control Plan Comments:   Induction:   IV  Beta Blocker:  Patient is not currently on a Beta-Blocker (No further documentation required).       Informed Consent: Patient understands risks and agrees with Anesthesia plan.  Questions answered. Anesthesia consent signed with patient.  ASA Score: 2     Day of Surgery Review of History & Physical: I have interviewed and examined the patient. I have reviewed the patient's H&P dated:  There are no significant changes.

## 2018-12-28 LAB
ANION GAP SERPL CALC-SCNC: 9 MMOL/L
BUN SERPL-MCNC: 10 MG/DL
CALCIUM SERPL-MCNC: 8.6 MG/DL
CHLORIDE SERPL-SCNC: 107 MMOL/L
CO2 SERPL-SCNC: 23 MMOL/L
CREAT SERPL-MCNC: 1 MG/DL
ERYTHROCYTE [DISTWIDTH] IN BLOOD BY AUTOMATED COUNT: 13.1 %
EST. GFR  (AFRICAN AMERICAN): >60 ML/MIN/1.73 M^2
EST. GFR  (NON AFRICAN AMERICAN): 58 ML/MIN/1.73 M^2
GLUCOSE SERPL-MCNC: 107 MG/DL
HCT VFR BLD AUTO: 30.8 %
HGB BLD-MCNC: 10.1 G/DL
MCH RBC QN AUTO: 28.7 PG
MCHC RBC AUTO-ENTMCNC: 32.8 G/DL
MCV RBC AUTO: 88 FL
PLATELET # BLD AUTO: 222 K/UL
PMV BLD AUTO: 9.4 FL
POTASSIUM SERPL-SCNC: 4 MMOL/L
RBC # BLD AUTO: 3.52 M/UL
SODIUM SERPL-SCNC: 139 MMOL/L
WBC # BLD AUTO: 12.66 K/UL

## 2018-12-28 PROCEDURE — 25000003 PHARM REV CODE 250: Performed by: SURGERY

## 2018-12-28 PROCEDURE — S0030 INJECTION, METRONIDAZOLE: HCPCS | Performed by: SURGERY

## 2018-12-28 PROCEDURE — 36415 COLL VENOUS BLD VENIPUNCTURE: CPT

## 2018-12-28 PROCEDURE — 99900035 HC TECH TIME PER 15 MIN (STAT)

## 2018-12-28 PROCEDURE — 80048 BASIC METABOLIC PNL TOTAL CA: CPT

## 2018-12-28 PROCEDURE — 94799 UNLISTED PULMONARY SVC/PX: CPT

## 2018-12-28 PROCEDURE — 11000001 HC ACUTE MED/SURG PRIVATE ROOM

## 2018-12-28 PROCEDURE — 85027 COMPLETE CBC AUTOMATED: CPT

## 2018-12-28 PROCEDURE — 94760 N-INVAS EAR/PLS OXIMETRY 1: CPT

## 2018-12-28 PROCEDURE — 63600175 PHARM REV CODE 636 W HCPCS: Performed by: SURGERY

## 2018-12-28 RX ADMIN — CIPROFLOXACIN 400 MG: 2 INJECTION, SOLUTION INTRAVENOUS at 06:12

## 2018-12-28 RX ADMIN — CHLORHEXIDINE GLUCONATE 10 ML: 1.2 RINSE ORAL at 09:12

## 2018-12-28 RX ADMIN — METRONIDAZOLE 500 MG: 500 INJECTION, SOLUTION INTRAVENOUS at 05:12

## 2018-12-28 RX ADMIN — METRONIDAZOLE 500 MG: 500 INJECTION, SOLUTION INTRAVENOUS at 09:12

## 2018-12-28 RX ADMIN — ACETAMINOPHEN 650 MG: 325 TABLET ORAL at 07:12

## 2018-12-28 RX ADMIN — HYDROCODONE BITARTRATE AND ACETAMINOPHEN 1 TABLET: 5; 325 TABLET ORAL at 05:12

## 2018-12-28 RX ADMIN — STANDARDIZED SENNA CONCENTRATE AND DOCUSATE SODIUM 1 TABLET: 8.6; 5 TABLET, FILM COATED ORAL at 09:12

## 2018-12-28 RX ADMIN — HYDROCODONE BITARTRATE AND ACETAMINOPHEN 1 TABLET: 5; 325 TABLET ORAL at 01:12

## 2018-12-28 RX ADMIN — METRONIDAZOLE 500 MG: 500 INJECTION, SOLUTION INTRAVENOUS at 01:12

## 2018-12-28 RX ADMIN — SODIUM CHLORIDE, SODIUM LACTATE, POTASSIUM CHLORIDE, AND CALCIUM CHLORIDE: .6; .31; .03; .02 INJECTION, SOLUTION INTRAVENOUS at 01:12

## 2018-12-28 NOTE — PLAN OF CARE
12/28/18 1433   Medicare Message   Important Message from Medicare regarding Discharge Appeal Rights Given to patient/caregiver;Explained to patient/caregiver;Signed/date by patient/caregiver   Date IMM was signed 12/28/18   Time IMM was signed 0085

## 2018-12-28 NOTE — PLAN OF CARE
CM met with patient at the bedside. CM explained role/purpose of visit. Pt states she lives with her spouse and is independent with ADL's. Pt denies having HH/DME. Patient denies any post hospital needs or services at this time. Transitional Care Folder, Discharge Planning Begins on Admission pamphlet, [facility] Bedside Delivery pamphlet, Advance Directive information given to patient along with the contact information given.Instructed patient or family to call with any questions or concerns. CM updated pt chart with CM Cira contact information. CM encouraged pt to phone cm with any questions.     Pharmacy[facility] [place]  Transportation: daughter Mihaela  No need at this time       12/28/18 0914   Discharge Assessment   Assessment Type Discharge Planning Assessment   Confirmed/corrected address and phone number on facesheet? Yes   Assessment information obtained from? Patient   Prior to hospitilization cognitive status: Alert/Oriented   Prior to hospitalization functional status: Independent   Current cognitive status: Alert/Oriented   Current Functional Status: Independent   Lives With spouse   Who are your caregiver(s) and their phone number(s)? Satnam Rehman-spouse [phone], Mihaela Mullen-dgt [phone]   Equipment Currently Used at Home none   Does the patient have transportation home? Yes   Transportation Anticipated family or friend will provide   Does the patient receive services at the [facility]? No   Discharge Plan A Home   Discharge Plan B Home with family   Patient/Family in Agreement with Plan yes

## 2018-12-28 NOTE — PLAN OF CARE
Problem: Adult Inpatient Plan of Care  Goal: Plan of Care Review  Outcome: Ongoing (interventions implemented as appropriate)  Pt has not complained of any pain. Dressing is dry and intact. Abdomen is non distended. Pt denies n/v. Iv fluids are infusing. IV ABX given per order. No injuries. Will continue to monitor. 12 hour chart check is completed.

## 2018-12-28 NOTE — HOSPITAL COURSE
12/28/2018:  The vital signs are stable.  Patient is doing quite well with minimal complaints of incisional pain. She is ambulating and working on incentive spirometer.  Lab works from this morning is within normal limits.  We will advance her diet.    12/19/18:  Pain controlled, no nausea, tolerated a diet, no bowel movements. Would like to go home

## 2018-12-28 NOTE — PLAN OF CARE
Problem: Adult Inpatient Plan of Care  Goal: Plan of Care Review  Outcome: Ongoing (interventions implemented as appropriate)  Plan of care reviewed and discussed with patient.  No acute distress noted.  Safety and fall precautions in place.  Patient free from injury.  Oral hydration and ambulation promoted.  Patient ambulated multiple times in hallway.  Soft diet initiated today.  Olsen catheter care given.  Pain managed adequately.  Will continue to monitor.

## 2018-12-29 VITALS
HEART RATE: 74 BPM | OXYGEN SATURATION: 96 % | TEMPERATURE: 99 F | RESPIRATION RATE: 16 BRPM | HEIGHT: 62 IN | WEIGHT: 110.44 LBS | DIASTOLIC BLOOD PRESSURE: 54 MMHG | SYSTOLIC BLOOD PRESSURE: 107 MMHG | BODY MASS INDEX: 20.32 KG/M2

## 2018-12-29 PROCEDURE — S0030 INJECTION, METRONIDAZOLE: HCPCS | Performed by: SURGERY

## 2018-12-29 PROCEDURE — 63600175 PHARM REV CODE 636 W HCPCS: Performed by: SURGERY

## 2018-12-29 PROCEDURE — 25000003 PHARM REV CODE 250: Performed by: SURGERY

## 2018-12-29 PROCEDURE — 94799 UNLISTED PULMONARY SVC/PX: CPT

## 2018-12-29 RX ORDER — CIPROFLOXACIN 500 MG/1
500 TABLET ORAL 2 TIMES DAILY
Qty: 10 TABLET | Refills: 0 | Status: SHIPPED | OUTPATIENT
Start: 2018-12-29 | End: 2019-01-03

## 2018-12-29 RX ORDER — HYDROCODONE BITARTRATE AND ACETAMINOPHEN 5; 325 MG/1; MG/1
TABLET ORAL
Qty: 20 TABLET | Refills: 0 | Status: SHIPPED | OUTPATIENT
Start: 2018-12-29 | End: 2019-10-31

## 2018-12-29 RX ORDER — HYDROCODONE BITARTRATE AND ACETAMINOPHEN 5; 325 MG/1; MG/1
1 TABLET ORAL EVERY 4 HOURS PRN
Status: DISCONTINUED | OUTPATIENT
Start: 2018-12-29 | End: 2018-12-29 | Stop reason: HOSPADM

## 2018-12-29 RX ORDER — METRONIDAZOLE 500 MG/1
500 TABLET ORAL EVERY 8 HOURS
Qty: 15 TABLET | Refills: 0 | Status: SHIPPED | OUTPATIENT
Start: 2018-12-29 | End: 2019-10-31

## 2018-12-29 RX ADMIN — METRONIDAZOLE 500 MG: 500 INJECTION, SOLUTION INTRAVENOUS at 05:12

## 2018-12-29 RX ADMIN — CIPROFLOXACIN 400 MG: 2 INJECTION, SOLUTION INTRAVENOUS at 06:12

## 2018-12-29 RX ADMIN — CHLORHEXIDINE GLUCONATE 10 ML: 1.2 RINSE ORAL at 09:12

## 2018-12-29 RX ADMIN — STANDARDIZED SENNA CONCENTRATE AND DOCUSATE SODIUM 1 TABLET: 8.6; 5 TABLET, FILM COATED ORAL at 09:12

## 2018-12-29 NOTE — PLAN OF CARE
Problem: Adult Inpatient Plan of Care  Goal: Patient-Specific Goal (Individualization)  Outcome: Ongoing (interventions implemented as appropriate)  Reviewed POC,  Including indications and possible side effects of administer medications. Pt. Verbalized understanding and teach back.  Remain free from injury. Bed low and lock, SRx2 up and call bell within reach. Patient doing well this shift.  IVF and antibiotics infusing as ordered.  Vital signs stable.  Pain well controlled.  No acute distress noted.  Will continue to monitor. 24 hour chart check performed.  Chart Check complete.

## 2018-12-29 NOTE — SUBJECTIVE & OBJECTIVE
Interval History: tolerated a regular diet, would like to go home    Medications:  Continuous Infusions:   lactated ringers 100 mL/hr at 12/28/18 1355     Scheduled Meds:   chlorhexidine  10 mL Mouth/Throat BID    ciprofloxacin  400 mg Intravenous Q12H    metronidazole  500 mg Intravenous Q8H    nozaseptin   Each Nare BID    senna-docusate 8.6-50 mg  1 tablet Oral BID     PRN Meds:acetaminophen, bisacodyl, cloNIDine, HYDROcodone-acetaminophen, HYDROmorphone, lactulose, ondansetron, ramelteon     Review of patient's allergies indicates:   Allergen Reactions    Penicillins      Other reaction(s): Unknown    Promethazine Itching     Objective:     Vital Signs (Most Recent):  Temp: 98.5 °F (36.9 °C) (12/29/18 0717)  Pulse: 74 (12/29/18 0717)  Resp: 16 (12/29/18 0717)  BP: (!) 107/54 (12/29/18 0717)  SpO2: 96 % (12/29/18 0717) Vital Signs (24h Range):  Temp:  [98 °F (36.7 °C)-99 °F (37.2 °C)] 98.5 °F (36.9 °C)  Pulse:  [73-82] 74  Resp:  [16-20] 16  SpO2:  [94 %-97 %] 96 %  BP: ()/(46-56) 107/54     Weight: 50.1 kg (110 lb 7.2 oz)  Body mass index is 20.2 kg/m².    Intake/Output - Last 3 Shifts       12/27 0700 - 12/28 0659 12/28 0700 - 12/29 0659 12/29 0700 - 12/30 0659    P.O.  720 240    I.V. (mL/kg) 3735 (74.6) 1216.7 (24.3)     IV Piggyback 500 500     Total Intake(mL/kg) 4235 (84.5) 2436.7 (48.6) 240 (4.8)    Urine (mL/kg/hr) 2300 (1.9) 1350 (1.1) 150 (1)    Blood 50      Total Output 2350 1350 150    Net +1885 +1086.7 +90                 Physical Exam   Constitutional: She appears well-developed and well-nourished. No distress.   Eyes: Scleral icterus is present.   Neck: Normal range of motion. Neck supple.   Cardiovascular: Normal rate, regular rhythm and normal heart sounds.   Pulmonary/Chest: Effort normal and breath sounds normal. No respiratory distress.   Abdominal: Soft. Bowel sounds are normal.   Incisions are dressed and dry   Neurological: She is alert.   Skin: Skin is warm and dry.  Capillary refill takes less than 2 seconds.   Psychiatric: She has a normal mood and affect. Her behavior is normal. Judgment and thought content normal.   Nursing note and vitals reviewed.      Significant Labs:  CBC:   Recent Labs   Lab 12/28/18  0543   WBC 12.66   RBC 3.52*   HGB 10.1*   HCT 30.8*      MCV 88   MCH 28.7   MCHC 32.8     BMP:   Recent Labs   Lab 12/28/18  0543         K 4.0      CO2 23   BUN 10   CREATININE 1.0   CALCIUM 8.6*       Significant Diagnostics:  none

## 2018-12-29 NOTE — PROGRESS NOTES
[facility]  General Surgery  Progress Note    Subjective:     History of Present Illness:  No notes on file    Post-Op Info:  Procedure(s) (LRB):  XI ROBOTIC COLECTOMY, SIGMOID (N/A)   2 Days Post-Op     Interval History: tolerated a regular diet, would like to go home    Medications:  Continuous Infusions:   lactated ringers 100 mL/hr at 12/28/18 1355     Scheduled Meds:   chlorhexidine  10 mL Mouth/Throat BID    ciprofloxacin  400 mg Intravenous Q12H    metronidazole  500 mg Intravenous Q8H    nozaseptin   Each Nare BID    senna-docusate 8.6-50 mg  1 tablet Oral BID     PRN Meds:acetaminophen, bisacodyl, cloNIDine, HYDROcodone-acetaminophen, HYDROmorphone, lactulose, ondansetron, ramelteon     Review of patient's allergies indicates:   Allergen Reactions    Penicillins      Other reaction(s): Unknown    Promethazine Itching     Objective:     Vital Signs (Most Recent):  Temp: 98.5 °F (36.9 °C) (12/29/18 0717)  Pulse: 74 (12/29/18 0717)  Resp: 16 (12/29/18 0717)  BP: (!) 107/54 (12/29/18 0717)  SpO2: 96 % (12/29/18 0717) Vital Signs (24h Range):  Temp:  [98 °F (36.7 °C)-99 °F (37.2 °C)] 98.5 °F (36.9 °C)  Pulse:  [73-82] 74  Resp:  [16-20] 16  SpO2:  [94 %-97 %] 96 %  BP: ()/(46-56) 107/54     Weight: 50.1 kg (110 lb 7.2 oz)  Body mass index is 20.2 kg/m².    Intake/Output - Last 3 Shifts       12/27 0700 - 12/28 0659 12/28 0700 - 12/29 0659 12/29 0700 - 12/30 0659    P.O.  720 240    I.V. (mL/kg) 3735 (74.6) 1216.7 (24.3)     IV Piggyback 500 500     Total Intake(mL/kg) 4235 (84.5) 2436.7 (48.6) 240 (4.8)    Urine (mL/kg/hr) 2300 (1.9) 1350 (1.1) 150 (1)    Blood 50      Total Output 2350 1350 150    Net +1885 +1086.7 +90                 Physical Exam   Constitutional: She appears well-developed and well-nourished. No distress.   Eyes: Scleral icterus is present.   Neck: Normal range of motion. Neck supple.   Cardiovascular: Normal rate, regular rhythm and normal heart sounds.    Pulmonary/Chest: Effort normal and breath sounds normal. No respiratory distress.   Abdominal: Soft. Bowel sounds are normal.   Incisions are dressed and dry   Neurological: She is alert.   Skin: Skin is warm and dry. Capillary refill takes less than 2 seconds.   Psychiatric: She has a normal mood and affect. Her behavior is normal. Judgment and thought content normal.   Nursing note and vitals reviewed.      Significant Labs:  CBC:   Recent Labs   Lab 12/28/18  0543   WBC 12.66   RBC 3.52*   HGB 10.1*   HCT 30.8*      MCV 88   MCH 28.7   MCHC 32.8     BMP:   Recent Labs   Lab 12/28/18  0543         K 4.0      CO2 23   BUN 10   CREATININE 1.0   CALCIUM 8.6*       Significant Diagnostics:  none    Assessment/Plan:     * Malignant neoplasm of sigmoid colon    Pod 2 diet tolerated, pain controled  Will discharge     Essential hypertension    Home verenice Chamberlain MD  General Surgery  [facility]

## 2018-12-29 NOTE — DISCHARGE SUMMARY
[facility]   General Surgery  Discharge Summary      Patient Name: Josy Rehman  MRN: [id number]  Admission Date: 12/27/2018  Hospital Length of Stay: 2 days  Discharge Date and Time:  12/29/2018 9:59 AM  Attending Physician: Ethan Mock MD   Discharging Provider: Simon Chamberlain MD  Primary Care Provider: China Ortiz MD    HPI:   No notes on file    Procedure(s) (LRB):  XI ROBOTIC COLECTOMY, SIGMOID (N/A)      Indwelling Lines/Drains at time of discharge:   Lines/Drains/Airways          None        Hospital Course: 12/28/2018:  The vital signs are stable.  Patient is doing quite well with minimal complaints of incisional pain. She is ambulating and working on incentive spirometer.  Lab works from this morning is within normal limits.  We will advance her diet.    12/19/18:  Pain controlled, no nausea, tolerated a diet, no bowel movements. Would like to go home    Consults:     Significant Diagnostic Studies: Labs:   BMP:   Recent Labs   Lab 12/28/18  0543         K 4.0      CO2 23   BUN 10   CREATININE 1.0   CALCIUM 8.6*   , CMP   Recent Labs   Lab 12/28/18  0543      K 4.0      CO2 23      BUN 10   CREATININE 1.0   CALCIUM 8.6*   ANIONGAP 9   ESTGFRAFRICA >60   EGFRNONAA 58*    and CBC   Recent Labs   Lab 12/28/18  0543   WBC 12.66   HGB 10.1*   HCT 30.8*          Pending Diagnostic Studies:     None        Final Active Diagnoses:    Diagnosis Date Noted POA    PRINCIPAL PROBLEM:  Malignant neoplasm of sigmoid colon [C18.7] 11/30/2018 Yes    Essential hypertension [I10]  Yes      Problems Resolved During this Admission:      Discharged Condition: stable    Disposition: Home or Self Care    Follow Up:    Patient Instructions:      Lifting restrictions   Order Comments: No lifting more than 30 pounds for 4 weeks     Notify your health care provider if you experience any of the following:  temperature >100.4     Notify your health care provider  if you experience any of the following:  persistent nausea and vomiting or diarrhea     Notify your health care provider if you experience any of the following:  severe uncontrolled pain     Notify your health care provider if you experience any of the following:  redness, tenderness, or signs of infection (pain, swelling, redness, odor or green/yellow discharge around incision site)     Remove dressing in 24 hours   Order Comments: May shower once the dressing are removed     Medications:  Reconciled Home Medications:      Medication List      START taking these medications    ciprofloxacin HCl 500 MG tablet  Commonly known as:  CIPRO  Take 1 tablet (500 mg total) by mouth 2 (two) times daily. for 5 days     HYDROcodone-acetaminophen 5-325 mg per tablet  Commonly known as:  NORCO  1 every 6 hour as needed for pain control     metroNIDAZOLE 500 MG tablet  Commonly known as:  FLAGYL  Take 1 tablet (500 mg total) by mouth every 8 (eight) hours. The night before surgery     nozaseptin nasal   Commonly known as:  NOZIN  1 each by Each Nare route 2 (two) times daily.        CONTINUE taking these medications    calcium carbonate 600 mg calcium (1,500 mg) Tab  Commonly known as:  OS-KHOI  Take 600 mg by mouth once daily.     hydroCHLOROthiazide 12.5 mg capsule  Commonly known as:  MICROZIDE  TAKE 1 CAPSULE (12.5 MG TOTAL) BY MOUTH ONCE DAILY.     ibuprofen 800 MG tablet  Commonly known as:  ADVIL,MOTRIN  Take 1 tablet (800 mg total) by mouth 3 (three) times daily as needed for Pain.     iron 18 mg Tab  Take by mouth.     ondansetron 4 MG tablet  Commonly known as:  ZOFRAN  Take 1 tablet (4 mg total) by mouth every 12 (twelve) hours as needed for Nausea.     potassium chloride SA 10 MEQ tablet  Commonly known as:  K-DUR,KLOR-CON  TAKE 1 TABLET BY MOUTH EVERY DAY     vitamin D 1000 units Tab  Commonly known as:  VITAMIN D3  Take 1,000 Units by mouth 2 (two) times daily.          Time spent on the discharge of  patient: 5 minutes    Simon Chamberlain MD  General Surgery  [facility]

## 2018-12-29 NOTE — NURSING
Discharge instructions reviewed with patient, verbalized understanding. Patient voiding spontaneously after catheter removal. Post op appointment made. Patient discharging to home.

## 2018-12-29 NOTE — DISCHARGE INSTRUCTIONS
Please call for any fever, increase in pain, nausea or vomiting or redness or drainage from incision(s).    No lifting more than 30 pounds for4 weeks    May shower once dressings are removed    If you become constipated from the pain medication you can use over the counter laxatives,  Miralax or Glycolax, or Magnesium Citrate for severe constipation.

## 2018-12-29 NOTE — PLAN OF CARE
Problem: Adult Inpatient Plan of Care  Goal: Plan of Care Review  Outcome: Outcome(s) achieved Date Met: 12/29/18  Plan of care reviewed with patient; verbalized understanding. Fall precautions maintained, patient remains free from injury. Pain being managed appropriately. Medications being administered as ordered. Dressings to abdomen are clean, dry and intact. Patient ambulating without difficulty. Voiding spontaneously without difficulty. Vital signs stable with no signs of distress noted. Bed low and locked with call light in reach. Will continue monitor.

## 2019-01-07 NOTE — PHYSICIAN QUERY
PT Name: Josy Rehman  MR #: [id number]     Physician Query Form - Diagnosis Clarification      CDS/: Brandy E [facility]               Contact information:  Spectralink:  [phone]    This form is a permanent document in the medical record.     Query Date: January 7, 2019    By submitting this query, we are merely seeking further clarification of documentation.  Please utilize your independent clinical judgment when addressing the question(s) below.     The medical record contains the following:      Findings Supporting Clinical Information Location in Medical Record     Sigmoid colon stricture versus stenosis due to malignancy versus diverticular disease.                 FINAL PATHOLOGIC DIAGNOSIS  1. Sigmoid colon, colectomy:  Diverticulosis with acute and chronic diverticulitis and reactive fibrosis resulting in stricture.  Six benign lymph nodes.  Negative for dysplasia and malignancy.  2. Donut rings:  No significant histologic abnormalities.  3. Rectum:  No significant histologic abnormalities    SURGERY DATE:  12/27/2018  Procedure(s) (LRB):  XI ROBOTIC COLECTOMY, SIGMOID (N/A)  FINDINGS:  The cut surface of the resected disease segment revealed stricture due to chronic diverticulitis.   POST-OP DIAGNOSIS:    Malignant neoplasm of sigmoid colon     PRINCIPAL PROBLEM:    Malignant neoplasm of sigmoid colon       H&P 11/30      Surgical pathology 12/27                  Op note 12/27                Discharge summary 12/29     Please clarify if the __malignant neoplasm of sigmoid colon___ diagnosis has been:    [  ] Ruled In   [ x ] Ruled Out   [  ] Other/Clarification of findings (please specify):   [  ] Clinically insignificant     [  ] Clinically undetermined     Please document in your progress notes daily for the duration of treatment, until resolved, and include in your discharge summary.

## 2019-01-10 ENCOUNTER — TELEPHONE (OUTPATIENT)
Dept: SURGERY | Facility: CLINIC | Age: 69
End: 2019-01-10

## 2019-01-10 NOTE — TELEPHONE ENCOUNTER
----- Message from Melvi Ann sent at 1/10/2019  9:41 AM CST -----  Contact: pt   States she's calling to confirm where her appt will be on the 15th and can be reached at [phone]//thanks/dbw

## 2019-01-15 ENCOUNTER — OFFICE VISIT (OUTPATIENT)
Dept: SURGERY | Facility: CLINIC | Age: 69
End: 2019-01-15
Payer: MEDICARE

## 2019-01-15 VITALS
TEMPERATURE: 99 F | DIASTOLIC BLOOD PRESSURE: 85 MMHG | BODY MASS INDEX: 20.4 KG/M2 | SYSTOLIC BLOOD PRESSURE: 150 MMHG | WEIGHT: 111.56 LBS

## 2019-01-15 DIAGNOSIS — Z98.890 POST-OPERATIVE STATE: Primary | ICD-10-CM

## 2019-01-15 DIAGNOSIS — K56.699 DIVERTICULAR STRICTURE: ICD-10-CM

## 2019-01-15 PROBLEM — Z12.11 SPECIAL SCREENING FOR MALIGNANT NEOPLASMS, COLON: Status: RESOLVED | Noted: 2018-11-21 | Resolved: 2019-01-15

## 2019-01-15 PROCEDURE — 99999 PR PBB SHADOW E&M-EST. PATIENT-LVL III: ICD-10-PCS | Mod: PBBFAC,,, | Performed by: PHYSICIAN ASSISTANT

## 2019-01-15 PROCEDURE — 99213 OFFICE O/P EST LOW 20 MIN: CPT | Mod: PBBFAC,PN | Performed by: PHYSICIAN ASSISTANT

## 2019-01-15 PROCEDURE — 99024 POSTOP FOLLOW-UP VISIT: CPT | Mod: POP,,, | Performed by: PHYSICIAN ASSISTANT

## 2019-01-15 PROCEDURE — 99024 PR POST-OP FOLLOW-UP VISIT: ICD-10-PCS | Mod: POP,,, | Performed by: PHYSICIAN ASSISTANT

## 2019-01-15 PROCEDURE — 99999 PR PBB SHADOW E&M-EST. PATIENT-LVL III: CPT | Mod: PBBFAC,,, | Performed by: PHYSICIAN ASSISTANT

## 2019-01-15 NOTE — PROGRESS NOTES
Josy Rehman is status post robotic sigmoid colectomy and presents today for follow-up care.  She c/o having to have BM shortly after meals. She denies diarrhea, nausea, emesis, fever, or chills. She is concerned b/c of weight loss prior to surgery. She has gained 1lb since surgery.     PE: Abdomen is soft, non-tender, non-distended.  Incisions clean, dry, and intact. Staples removed.     Pathology: 1. Sigmoid colon, colectomy:  Diverticulosis with acute and chronic diverticulitis and reactive fibrosis resulting in stricture.  Six benign lymph nodes.  Negative for dysplasia and malignancy.  2. Donut rings:  No significant histologic abnormalities.  3. Rectum:  No significant histologic abnormalities.    A/P:  Normal post-operative course.    The patient is instructed to avoid heavy lifting until 6 weeks after the surgery date.  Return to clinic as needed. Recommend Boost or similar supplement. High fiber diet or fiber supplement. F/U with PCP.

## 2019-02-11 RX ORDER — HYDROCHLOROTHIAZIDE 12.5 MG/1
12.5 CAPSULE ORAL DAILY
Qty: 30 CAPSULE | Refills: 6 | Status: SHIPPED | OUTPATIENT
Start: 2019-02-11 | End: 2019-08-30 | Stop reason: SDUPTHER

## 2019-04-30 ENCOUNTER — TELEPHONE (OUTPATIENT)
Dept: FAMILY MEDICINE | Facility: CLINIC | Age: 69
End: 2019-04-30

## 2019-04-30 DIAGNOSIS — Z12.31 ENCOUNTER FOR SCREENING MAMMOGRAM FOR MALIGNANT NEOPLASM OF BREAST: Primary | ICD-10-CM

## 2019-04-30 NOTE — TELEPHONE ENCOUNTER
----- Message from Amira Mckinney sent at 4/30/2019 10:19 AM CDT -----  Contact: pt  Pt would like to be called back regarding mammo orders    Pt can be reached at [phone]

## 2019-08-05 ENCOUNTER — HOSPITAL ENCOUNTER (OUTPATIENT)
Dept: RADIOLOGY | Facility: HOSPITAL | Age: 69
Discharge: HOME | End: 2019-08-05
Attending: FAMILY MEDICINE
Payer: MEDICARE

## 2019-08-05 VITALS — WEIGHT: 111 LBS | HEIGHT: 62 IN | BODY MASS INDEX: 20.43 KG/M2

## 2019-08-05 DIAGNOSIS — Z12.31 ENCOUNTER FOR SCREENING MAMMOGRAM FOR MALIGNANT NEOPLASM OF BREAST: ICD-10-CM

## 2019-08-05 PROCEDURE — 77063 MAMMO DIGITAL SCREENING BILAT WITH TOMOSYNTHESIS_CAD: ICD-10-PCS | Mod: 26,,, | Performed by: RADIOLOGY

## 2019-08-05 PROCEDURE — 77067 SCR MAMMO BI INCL CAD: CPT | Mod: TC

## 2019-08-05 PROCEDURE — 77067 MAMMO DIGITAL SCREENING BILAT WITH TOMOSYNTHESIS_CAD: ICD-10-PCS | Mod: 26,,, | Performed by: RADIOLOGY

## 2019-08-05 PROCEDURE — 77067 SCR MAMMO BI INCL CAD: CPT | Mod: 26,,, | Performed by: RADIOLOGY

## 2019-08-05 PROCEDURE — 77063 BREAST TOMOSYNTHESIS BI: CPT | Mod: 26,,, | Performed by: RADIOLOGY

## 2019-08-30 RX ORDER — HYDROCHLOROTHIAZIDE 12.5 MG/1
12.5 CAPSULE ORAL DAILY
Qty: 30 CAPSULE | Refills: 0 | Status: SHIPPED | OUTPATIENT
Start: 2019-08-30 | End: 2019-09-24 | Stop reason: SDUPTHER

## 2019-09-19 ENCOUNTER — PATIENT OUTREACH (OUTPATIENT)
Dept: ADMINISTRATIVE | Facility: HOSPITAL | Age: 69
End: 2019-09-19

## 2019-09-19 NOTE — PROGRESS NOTES
PreVisit Chart Audit Perfomed     Abbey WANG LPN Care Coordinator  Care Coordination Department  [facility]  [phone]

## 2019-09-24 ENCOUNTER — TELEPHONE (OUTPATIENT)
Dept: FAMILY MEDICINE | Facility: CLINIC | Age: 69
End: 2019-09-24

## 2019-09-24 RX ORDER — POTASSIUM CHLORIDE 750 MG/1
10 TABLET, EXTENDED RELEASE ORAL DAILY
Qty: 30 TABLET | Refills: 0 | Status: SHIPPED | OUTPATIENT
Start: 2019-09-24 | End: 2019-10-23 | Stop reason: SDUPTHER

## 2019-09-24 RX ORDER — HYDROCHLOROTHIAZIDE 12.5 MG/1
12.5 CAPSULE ORAL DAILY
Qty: 30 CAPSULE | Refills: 0 | Status: SHIPPED | OUTPATIENT
Start: 2019-09-24 | End: 2019-10-23 | Stop reason: SDUPTHER

## 2019-09-24 NOTE — TELEPHONE ENCOUNTER
Patient wanted morning appointments on the same day for AWV and annual. Unable to schedule a morning appointment with Delicia on the same date. Annual only- rescheduled to 10/31 @ 11:30. I will forward patient's information to Delicia Rehman', NP staff to contact patient regarding AWV/vlw

## 2019-09-24 NOTE — TELEPHONE ENCOUNTER
Lvm asking patient to give us a call to reschedule annual with Muratore; current annual cancelled./courtney

## 2019-10-23 RX ORDER — POTASSIUM CHLORIDE 750 MG/1
TABLET, EXTENDED RELEASE ORAL
Qty: 30 TABLET | Refills: 0 | Status: SHIPPED | OUTPATIENT
Start: 2019-10-23 | End: 2019-10-31 | Stop reason: SDUPTHER

## 2019-10-23 RX ORDER — HYDROCHLOROTHIAZIDE 12.5 MG/1
12.5 CAPSULE ORAL DAILY
Qty: 30 CAPSULE | Refills: 0 | Status: SHIPPED | OUTPATIENT
Start: 2019-10-23 | End: 2019-10-31 | Stop reason: SDUPTHER

## 2019-10-23 RX ORDER — HYDROCHLOROTHIAZIDE 12.5 MG/1
CAPSULE ORAL
Qty: 30 CAPSULE | Refills: 0 | Status: SHIPPED | OUTPATIENT
Start: 2019-10-23 | End: 2019-10-23 | Stop reason: SDUPTHER

## 2019-10-31 ENCOUNTER — LAB VISIT (OUTPATIENT)
Dept: LAB | Facility: HOSPITAL | Age: 69
End: 2019-10-31
Payer: MEDICARE

## 2019-10-31 ENCOUNTER — OFFICE VISIT (OUTPATIENT)
Dept: FAMILY MEDICINE | Facility: CLINIC | Age: 69
End: 2019-10-31
Payer: MEDICARE

## 2019-10-31 VITALS
DIASTOLIC BLOOD PRESSURE: 74 MMHG | WEIGHT: 117.06 LBS | SYSTOLIC BLOOD PRESSURE: 116 MMHG | HEIGHT: 62 IN | HEART RATE: 78 BPM | OXYGEN SATURATION: 99 % | BODY MASS INDEX: 21.54 KG/M2 | TEMPERATURE: 98 F

## 2019-10-31 DIAGNOSIS — Z01.419 WELL FEMALE EXAM WITH ROUTINE GYNECOLOGICAL EXAM: ICD-10-CM

## 2019-10-31 DIAGNOSIS — I10 ESSENTIAL HYPERTENSION: ICD-10-CM

## 2019-10-31 DIAGNOSIS — E55.9 VITAMIN D DEFICIENCY DISEASE: ICD-10-CM

## 2019-10-31 DIAGNOSIS — E78.5 HYPERLIPIDEMIA, UNSPECIFIED HYPERLIPIDEMIA TYPE: ICD-10-CM

## 2019-10-31 DIAGNOSIS — M85.80 OSTEOPENIA, UNSPECIFIED LOCATION: ICD-10-CM

## 2019-10-31 DIAGNOSIS — I10 ESSENTIAL HYPERTENSION: Primary | ICD-10-CM

## 2019-10-31 LAB
25(OH)D3+25(OH)D2 SERPL-MCNC: 129 NG/ML (ref 30–96)
ALBUMIN SERPL BCP-MCNC: 4.4 G/DL (ref 3.5–5.2)
ALP SERPL-CCNC: 52 U/L (ref 55–135)
ALT SERPL W/O P-5'-P-CCNC: 16 U/L (ref 10–44)
ANION GAP SERPL CALC-SCNC: 10 MMOL/L (ref 8–16)
AST SERPL-CCNC: 26 U/L (ref 10–40)
BASOPHILS # BLD AUTO: 0.1 K/UL (ref 0–0.2)
BASOPHILS NFR BLD: 1.6 % (ref 0–1.9)
BILIRUB SERPL-MCNC: 0.7 MG/DL (ref 0.1–1)
BUN SERPL-MCNC: 18 MG/DL (ref 8–23)
CALCIUM SERPL-MCNC: 10.5 MG/DL (ref 8.7–10.5)
CHLORIDE SERPL-SCNC: 104 MMOL/L (ref 95–110)
CHOLEST SERPL-MCNC: 238 MG/DL (ref 120–199)
CHOLEST/HDLC SERPL: 3.1 {RATIO} (ref 2–5)
CO2 SERPL-SCNC: 27 MMOL/L (ref 23–29)
CREAT SERPL-MCNC: 1.1 MG/DL (ref 0.5–1.4)
DIFFERENTIAL METHOD: ABNORMAL
EOSINOPHIL # BLD AUTO: 0.3 K/UL (ref 0–0.5)
EOSINOPHIL NFR BLD: 4.3 % (ref 0–8)
ERYTHROCYTE [DISTWIDTH] IN BLOOD BY AUTOMATED COUNT: 13.1 % (ref 11.5–14.5)
EST. GFR  (AFRICAN AMERICAN): 58.8 ML/MIN/1.73 M^2
EST. GFR  (NON AFRICAN AMERICAN): 51 ML/MIN/1.73 M^2
GLUCOSE SERPL-MCNC: 83 MG/DL (ref 70–110)
HCT VFR BLD AUTO: 42.9 % (ref 37–48.5)
HDLC SERPL-MCNC: 76 MG/DL (ref 40–75)
HDLC SERPL: 31.9 % (ref 20–50)
HGB BLD-MCNC: 12.9 G/DL (ref 12–16)
IMM GRANULOCYTES # BLD AUTO: 0.02 K/UL (ref 0–0.04)
IMM GRANULOCYTES NFR BLD AUTO: 0.3 % (ref 0–0.5)
LDLC SERPL CALC-MCNC: 146.4 MG/DL (ref 63–159)
LYMPHOCYTES # BLD AUTO: 2.6 K/UL (ref 1–4.8)
LYMPHOCYTES NFR BLD: 42.5 % (ref 18–48)
MCH RBC QN AUTO: 28.5 PG (ref 27–31)
MCHC RBC AUTO-ENTMCNC: 30.1 G/DL (ref 32–36)
MCV RBC AUTO: 95 FL (ref 82–98)
MONOCYTES # BLD AUTO: 0.6 K/UL (ref 0.3–1)
MONOCYTES NFR BLD: 9.2 % (ref 4–15)
NEUTROPHILS # BLD AUTO: 2.6 K/UL (ref 1.8–7.7)
NEUTROPHILS NFR BLD: 42.1 % (ref 38–73)
NONHDLC SERPL-MCNC: 162 MG/DL
NRBC BLD-RTO: 0 /100 WBC
PLATELET # BLD AUTO: 299 K/UL (ref 150–350)
PMV BLD AUTO: 10.2 FL (ref 9.2–12.9)
POTASSIUM SERPL-SCNC: 3.7 MMOL/L (ref 3.5–5.1)
PROT SERPL-MCNC: 8.5 G/DL (ref 6–8.4)
RBC # BLD AUTO: 4.53 M/UL (ref 4–5.4)
SODIUM SERPL-SCNC: 141 MMOL/L (ref 136–145)
TRIGL SERPL-MCNC: 78 MG/DL (ref 30–150)
TSH SERPL DL<=0.005 MIU/L-ACNC: 0.92 UIU/ML (ref 0.4–4)
WBC # BLD AUTO: 6.07 K/UL (ref 3.9–12.7)

## 2019-10-31 PROCEDURE — 99214 PR OFFICE/OUTPT VISIT, EST, LEVL IV, 30-39 MIN: ICD-10-PCS | Mod: 25,S$PBB,, | Performed by: FAMILY MEDICINE

## 2019-10-31 PROCEDURE — 80061 LIPID PANEL: CPT

## 2019-10-31 PROCEDURE — 84443 ASSAY THYROID STIM HORMONE: CPT

## 2019-10-31 PROCEDURE — 82306 VITAMIN D 25 HYDROXY: CPT

## 2019-10-31 PROCEDURE — 99213 OFFICE O/P EST LOW 20 MIN: CPT | Mod: PBBFAC,PO,25 | Performed by: FAMILY MEDICINE

## 2019-10-31 PROCEDURE — G0101 CA SCREEN;PELVIC/BREAST EXAM: HCPCS | Mod: PBBFAC,PO | Performed by: FAMILY MEDICINE

## 2019-10-31 PROCEDURE — 36415 COLL VENOUS BLD VENIPUNCTURE: CPT | Mod: PO

## 2019-10-31 PROCEDURE — 80053 COMPREHEN METABOLIC PANEL: CPT

## 2019-10-31 PROCEDURE — 99999 PR PBB SHADOW E&M-EST. PATIENT-LVL III: CPT | Mod: PBBFAC,,, | Performed by: FAMILY MEDICINE

## 2019-10-31 PROCEDURE — 99214 OFFICE O/P EST MOD 30 MIN: CPT | Mod: 25,S$PBB,, | Performed by: FAMILY MEDICINE

## 2019-10-31 PROCEDURE — G0101 CA SCREEN;PELVIC/BREAST EXAM: HCPCS | Mod: S$PBB,,, | Performed by: FAMILY MEDICINE

## 2019-10-31 PROCEDURE — 99999 PR PBB SHADOW E&M-EST. PATIENT-LVL III: ICD-10-PCS | Mod: PBBFAC,,, | Performed by: FAMILY MEDICINE

## 2019-10-31 PROCEDURE — 85025 COMPLETE CBC W/AUTO DIFF WBC: CPT

## 2019-10-31 PROCEDURE — G0101 PR CA SCREEN;PELVIC/BREAST EXAM: ICD-10-PCS | Mod: S$PBB,,, | Performed by: FAMILY MEDICINE

## 2019-10-31 RX ORDER — POTASSIUM CHLORIDE 750 MG/1
10 TABLET, EXTENDED RELEASE ORAL DAILY
Qty: 90 TABLET | Refills: 3 | Status: SHIPPED | OUTPATIENT
Start: 2019-10-31 | End: 2020-09-24

## 2019-10-31 RX ORDER — HYDROCHLOROTHIAZIDE 12.5 MG/1
12.5 CAPSULE ORAL DAILY
Qty: 90 CAPSULE | Refills: 3 | Status: SHIPPED | OUTPATIENT
Start: 2019-10-31 | End: 2020-10-30

## 2019-10-31 NOTE — PROGRESS NOTES
CHIEF COMPLAINT:  This is a 67-year-old female here for preventive health exam.     SUBJECTIVE: Patient's doing well without complaints.  She has regained 6 lb after a low weight of 111 status post sigmoid resection due to diverticular stricture.  She reports that knee pain has diminished.  She finds that taking calcium supplement 3 tablets daily helps with her pain. She takes a total of 1800 mg of calcium daily and 6500 international units of vitamin D3.  She has hypertension which is  controlled on hydrochlorothiazide.  She takes potassium supplement for hypokalemia.   Patient has not had any recent episodes of vertigo for which she has taken meclizine and ondansetron in the past.       Eye exam June 2018.  Pap smear September 2014. Mammogram, August 2019.  Bone DEXA scan, October 2016, due again in October 2019 to October 2021.  Colonoscopy November 2018 due again November 2028. Tdap August 2012. Zostavax August 2012.  Prevnar vaccine September 2015.  Pneumovax September 2016.  Influenza vaccine September 2018.     ROS:  GENERAL: Patient denies fever, chills, night sweats. Patient denies weight loss. Patient denies anorexia, fatigue, weakness or swollen glands.  Positive for  SKIN: Patient denies rash or hair loss.  HEENT: Patient denies sore throat, ear pain, hearing loss, nasal congestion, or runny nose. Patient denies visual disturbance, eye irritation or discharge.  LUNGS: Patient denies cough, wheeze or hemoptysis.  CARDIOVASCULAR: Patient denies chest pain, shortness of breath, palpitations, syncope or lower extremity edema.  GI: Patient denies abdominal pain, nausea, vomiting, diarrhea, constipation, blood in stool or melena.  GENITOURINARY: Patient denies pelvic pain, vaginal discharge, itch or odor. Patient denies irregular vaginal bleeding. Patient denies dysuria, frequency, hematuria, nocturia, urgency or incontinence.  BREASTS: Patient denies breast pain, mass or nipple discharge.  MUSCULOSKELETAL:  Patient denies joint swelling, redness or warmth.  NEUROLOGIC: Patient denies headache, vertigo, paresthesias, weakness in limb, dysarthria, dysphagia or abnormality of gait.  PSYCHIATRIC: Patient denies anxiety, depression, or memory loss.     OBJECTIVE:   GENERAL: Well-developed well-nourished black female alert and oriented x3, in no acute distress. Weight gain of 3 lb in the last year.  SKIN: Clear without rash.  Normal color and tone.  Sebaceous cyst, posterior neck.  HEENT: Eyes: Clear conjunctivae. No scleral icterus. Pupils equal reactive to light and accommodation. Ears: Clear TMs. Clear canals. Nose: Without congestion. Pharynx: Without injection or exudates.    NECK: Supple, normal range of motion. No masses, lymphadenopathy or enlarged thyroid. No JVD. Carotids 2+ and equal. No bruits.    LUNGS: Clear to auscultation. Normal respiratory effort.    CARDIOVASCULAR: Regular rhythm, normal S1, S2 without murmur, gallop or rub.    BACK: No CVA or spinal tenderness.    BREASTS: No masses, tenderness or nipple discharge.    ABDOMEN: Normal appearance. Active bowel sounds. Soft, nontender without mass or organomegaly. No rebound or guarding.    EXTREMITIES: Without cyanosis, clubbing or edema. Distal pulses 2+ and equal. Normal range of motion in all extremities. No joint effusion, erythema or warmth. Bunions bilaterally, left greater than right.  Right knee enlargement greater than left.  NEUROLOGIC: Cranial nerves II through XII without deficit. Motor strength equal bilaterally. Sensation normal to touch. Deep tendon reflexes 2+ and equal. Gait without abnormality. No tremor. Negative cerebellar signs.    PELVIC: External: Without lesions or inflammation. Vaginal: Atrophic changes. Cervix: Normal appearance, nontender. No Pap. Uterus: Normal size and shape. Adnexa: Non-tender, without masses. Rectovaginal: Confirms, heme-negative stool x2.      ASSESSMENT:  1. Essential hypertension    2. Osteopenia,  unspecified location    3. Vitamin D deficiency disease      PLAN:  1.  Exercise regularly.  2.  Age-appropriate counseling.  3.  Fasting lab.  4.  Obtain influenza vaccine at the pharmacy.  5.  Refill medications.  6.  Follow-up annually.     This note is generated with speech recognition software and is subject to transcription error and sound alike phrases that may be missed by proofreading.

## 2020-01-24 ENCOUNTER — TELEPHONE (OUTPATIENT)
Dept: FAMILY MEDICINE | Facility: CLINIC | Age: 70
End: 2020-01-24

## 2020-01-24 DIAGNOSIS — Z12.31 ENCOUNTER FOR SCREENING MAMMOGRAM FOR MALIGNANT NEOPLASM OF BREAST: Primary | ICD-10-CM

## 2020-01-24 NOTE — TELEPHONE ENCOUNTER
Patient taken care of in another encounter  ----- Message from Carri Rehman sent at 1/24/2020  3:36 PM CST -----  Contact: pt  Type:  Patient Returning Call    Who Called: the pt   Who Left Message for Patient: unknown   Does the patient know what this is regarding?: no  Would the patient rather a call back or a response via [facility]? Call back  Best Call Back Number: [phone]  Additional Information: n/a

## 2020-01-24 NOTE — TELEPHONE ENCOUNTER
Last annual: 10/31/19  Last mammo: 8/5/201  Mammogram not due. LVM advising patient.    ----- Message from Jh Maradiaga sent at 1/24/2020 10:03 AM CST -----  Contact: pt   .Type:  Mammogram    Caller is requesting to schedule their annual mammogram appointment.  Order is not listed in EPIC.  Please enter order and contact patient to schedule.  Name of Caller: pt   Where would they like the mammogram performed? [place]   Would the patient rather a call back or a response via [facility]? Callback   Best Call Back Number .[phone]     Additional Information

## 2020-08-05 ENCOUNTER — HOSPITAL ENCOUNTER (OUTPATIENT)
Dept: RADIOLOGY | Facility: HOSPITAL | Age: 70
Discharge: HOME | End: 2020-08-05
Attending: FAMILY MEDICINE
Payer: MEDICARE

## 2020-08-05 VITALS — BODY MASS INDEX: 21.54 KG/M2 | WEIGHT: 117.06 LBS | HEIGHT: 62 IN

## 2020-08-05 DIAGNOSIS — Z12.31 ENCOUNTER FOR SCREENING MAMMOGRAM FOR MALIGNANT NEOPLASM OF BREAST: ICD-10-CM

## 2020-08-05 PROCEDURE — 77067 SCR MAMMO BI INCL CAD: CPT | Mod: 26,,, | Performed by: RADIOLOGY

## 2020-08-05 PROCEDURE — 77067 MAMMO DIGITAL SCREENING BILAT WITH TOMOSYNTHESIS_CAD: ICD-10-PCS | Mod: 26,,, | Performed by: RADIOLOGY

## 2020-08-05 PROCEDURE — 77063 MAMMO DIGITAL SCREENING BILAT WITH TOMOSYNTHESIS_CAD: ICD-10-PCS | Mod: 26,,, | Performed by: RADIOLOGY

## 2020-08-05 PROCEDURE — 77063 BREAST TOMOSYNTHESIS BI: CPT | Mod: 26,,, | Performed by: RADIOLOGY

## 2020-08-05 PROCEDURE — 77067 SCR MAMMO BI INCL CAD: CPT | Mod: TC

## 2020-09-24 RX ORDER — POTASSIUM CHLORIDE 750 MG/1
TABLET, EXTENDED RELEASE ORAL
Qty: 90 TABLET | Refills: 0 | Status: SHIPPED | OUTPATIENT
Start: 2020-09-24 | End: 2020-11-02 | Stop reason: SDUPTHER

## 2020-09-29 ENCOUNTER — PATIENT MESSAGE (OUTPATIENT)
Dept: OTHER | Facility: OTHER | Age: 70
End: 2020-09-29

## 2020-10-13 ENCOUNTER — OFFICE VISIT (OUTPATIENT)
Dept: OPHTHALMOLOGY | Facility: CLINIC | Age: 70
End: 2020-10-13
Payer: MEDICARE

## 2020-10-13 DIAGNOSIS — H25.13 NUCLEAR SCLEROSIS, BILATERAL: Primary | ICD-10-CM

## 2020-10-13 DIAGNOSIS — H52.4 MYOPIA WITH PRESBYOPIA, BILATERAL: ICD-10-CM

## 2020-10-13 DIAGNOSIS — H52.13 MYOPIA WITH PRESBYOPIA, BILATERAL: ICD-10-CM

## 2020-10-13 PROCEDURE — 99999 PR PBB SHADOW E&M-EST. PATIENT-LVL II: ICD-10-PCS | Mod: PBBFAC,,, | Performed by: OPTOMETRIST

## 2020-10-13 PROCEDURE — 99999 PR PBB SHADOW E&M-EST. PATIENT-LVL II: CPT | Mod: PBBFAC,,, | Performed by: OPTOMETRIST

## 2020-10-13 PROCEDURE — 92015 DETERMINE REFRACTIVE STATE: CPT | Mod: ,,, | Performed by: OPTOMETRIST

## 2020-10-13 PROCEDURE — 99212 OFFICE O/P EST SF 10 MIN: CPT | Mod: PBBFAC | Performed by: OPTOMETRIST

## 2020-10-13 PROCEDURE — 92014 PR EYE EXAM, EST PATIENT,COMPREHESV: ICD-10-PCS | Mod: S$PBB,,, | Performed by: OPTOMETRIST

## 2020-10-13 PROCEDURE — 92015 PR REFRACTION: ICD-10-PCS | Mod: ,,, | Performed by: OPTOMETRIST

## 2020-10-13 PROCEDURE — 92014 COMPRE OPH EXAM EST PT 1/>: CPT | Mod: S$PBB,,, | Performed by: OPTOMETRIST

## 2020-10-13 NOTE — PROGRESS NOTES
HPI     NP to DNL   Last seen by [facility] on 6/27/18 for yearly eye exam  Patient here today for yearly eye exam  HPI    Any vision changes since last exam: Pt states that OS Feels like there is   a film over eyes. And also having night blindness.  Eye pain: no  Other ocular symptoms: none    Do you wear currently wear glasses or contacts? none    Interested in contacts today? no    Do you plan on getting new glasses today? If needed        Last edited by Catia Marin MA on 10/13/2020  8:35 AM. (History)            Assessment /Plan     For exam results, see Encounter Report.    Nuclear sclerosis, bilateral  Cataract accounts for vision change. New Rx for glasses/contacts will not improve vision.   Refer to ophthalmologist for cataract evaluation.     Myopia with presbyopia, bilateral  Hold spec Rx      RTC next available with MGM for cataract evaluation or PRN if any problems.   Discussed above and answered questions.

## 2020-10-30 RX ORDER — HYDROCHLOROTHIAZIDE 12.5 MG/1
CAPSULE ORAL
Qty: 90 CAPSULE | Refills: 0 | Status: SHIPPED | OUTPATIENT
Start: 2020-10-30 | End: 2020-11-02 | Stop reason: SDUPTHER

## 2020-11-02 ENCOUNTER — OFFICE VISIT (OUTPATIENT)
Dept: FAMILY MEDICINE | Facility: CLINIC | Age: 70
End: 2020-11-02
Payer: MEDICARE

## 2020-11-02 ENCOUNTER — LAB VISIT (OUTPATIENT)
Dept: LAB | Facility: HOSPITAL | Age: 70
End: 2020-11-02
Payer: MEDICARE

## 2020-11-02 VITALS
HEIGHT: 62 IN | SYSTOLIC BLOOD PRESSURE: 139 MMHG | DIASTOLIC BLOOD PRESSURE: 80 MMHG | BODY MASS INDEX: 20.98 KG/M2 | OXYGEN SATURATION: 98 % | TEMPERATURE: 98 F | WEIGHT: 114 LBS | HEART RATE: 72 BPM

## 2020-11-02 VITALS
HEART RATE: 72 BPM | OXYGEN SATURATION: 98 % | DIASTOLIC BLOOD PRESSURE: 80 MMHG | BODY MASS INDEX: 21.14 KG/M2 | SYSTOLIC BLOOD PRESSURE: 148 MMHG | TEMPERATURE: 98 F | HEIGHT: 62 IN | WEIGHT: 114.88 LBS

## 2020-11-02 DIAGNOSIS — M17.0 PRIMARY OSTEOARTHRITIS OF BOTH KNEES: ICD-10-CM

## 2020-11-02 DIAGNOSIS — I10 ESSENTIAL HYPERTENSION: Primary | ICD-10-CM

## 2020-11-02 DIAGNOSIS — E78.5 HYPERLIPIDEMIA, UNSPECIFIED HYPERLIPIDEMIA TYPE: ICD-10-CM

## 2020-11-02 DIAGNOSIS — R42 VERTIGO: ICD-10-CM

## 2020-11-02 DIAGNOSIS — I10 ESSENTIAL HYPERTENSION: Chronic | ICD-10-CM

## 2020-11-02 DIAGNOSIS — E55.9 VITAMIN D DEFICIENCY DISEASE: ICD-10-CM

## 2020-11-02 DIAGNOSIS — Z00.00 ENCOUNTER FOR PREVENTIVE HEALTH EXAMINATION: Primary | ICD-10-CM

## 2020-11-02 DIAGNOSIS — M85.80 OSTEOPENIA, UNSPECIFIED LOCATION: ICD-10-CM

## 2020-11-02 DIAGNOSIS — I10 ESSENTIAL HYPERTENSION: ICD-10-CM

## 2020-11-02 DIAGNOSIS — H25.9 AGE-RELATED CATARACT OF BOTH EYES, UNSPECIFIED AGE-RELATED CATARACT TYPE: ICD-10-CM

## 2020-11-02 DIAGNOSIS — E67.3 HYPERVITAMINOSIS D: ICD-10-CM

## 2020-11-02 PROCEDURE — 99213 OFFICE O/P EST LOW 20 MIN: CPT | Mod: PBBFAC,PO | Performed by: FAMILY MEDICINE

## 2020-11-02 PROCEDURE — 99999 PR PBB SHADOW E&M-EST. PATIENT-LVL III: CPT | Mod: PBBFAC,,, | Performed by: NURSE PRACTITIONER

## 2020-11-02 PROCEDURE — 99213 OFFICE O/P EST LOW 20 MIN: CPT | Mod: PBBFAC,27,PO,25 | Performed by: NURSE PRACTITIONER

## 2020-11-02 PROCEDURE — 36415 COLL VENOUS BLD VENIPUNCTURE: CPT | Mod: PO

## 2020-11-02 PROCEDURE — 99214 OFFICE O/P EST MOD 30 MIN: CPT | Mod: 25,S$PBB,, | Performed by: FAMILY MEDICINE

## 2020-11-02 PROCEDURE — 99999 PR PBB SHADOW E&M-EST. PATIENT-LVL III: CPT | Mod: PBBFAC,,, | Performed by: FAMILY MEDICINE

## 2020-11-02 PROCEDURE — G0439 PPPS, SUBSEQ VISIT: HCPCS | Mod: S$GLB,,, | Performed by: NURSE PRACTITIONER

## 2020-11-02 PROCEDURE — G0439 PR MEDICARE ANNUAL WELLNESS SUBSEQUENT VISIT: ICD-10-PCS | Mod: S$GLB,,, | Performed by: NURSE PRACTITIONER

## 2020-11-02 PROCEDURE — 99214 PR OFFICE/OUTPT VISIT, EST, LEVL IV, 30-39 MIN: ICD-10-PCS | Mod: 25,S$PBB,, | Performed by: FAMILY MEDICINE

## 2020-11-02 PROCEDURE — 99999 PR PBB SHADOW E&M-EST. PATIENT-LVL III: ICD-10-PCS | Mod: PBBFAC,,, | Performed by: NURSE PRACTITIONER

## 2020-11-02 PROCEDURE — 82306 VITAMIN D 25 HYDROXY: CPT

## 2020-11-02 PROCEDURE — 99999 PR PBB SHADOW E&M-EST. PATIENT-LVL III: ICD-10-PCS | Mod: PBBFAC,,, | Performed by: FAMILY MEDICINE

## 2020-11-02 PROCEDURE — 80053 COMPREHEN METABOLIC PANEL: CPT

## 2020-11-02 PROCEDURE — 80061 LIPID PANEL: CPT

## 2020-11-02 PROCEDURE — 84443 ASSAY THYROID STIM HORMONE: CPT

## 2020-11-02 PROCEDURE — 90694 VACC AIIV4 NO PRSRV 0.5ML IM: CPT | Mod: PBBFAC,PO

## 2020-11-02 PROCEDURE — G0008 ADMIN INFLUENZA VIRUS VAC: HCPCS | Mod: PBBFAC,PO

## 2020-11-02 PROCEDURE — 85025 COMPLETE CBC W/AUTO DIFF WBC: CPT

## 2020-11-02 RX ORDER — POTASSIUM CHLORIDE 750 MG/1
10 TABLET, EXTENDED RELEASE ORAL DAILY
Qty: 90 TABLET | Refills: 3 | Status: SHIPPED | OUTPATIENT
Start: 2020-11-02 | End: 2021-11-04 | Stop reason: SDUPTHER

## 2020-11-02 RX ORDER — HYDROCHLOROTHIAZIDE 12.5 MG/1
12.5 CAPSULE ORAL DAILY
Qty: 90 CAPSULE | Refills: 3 | Status: SHIPPED | OUTPATIENT
Start: 2020-11-02 | End: 2021-11-04 | Stop reason: SDUPTHER

## 2020-11-02 NOTE — PROGRESS NOTES
CHIEF COMPLAINT:  This is a 70-year-old female here for preventive health exam.     SUBJECTIVE: Patient's doing well without complaints.  She has essential hypertension for which she takes HCTZ 12.5 mg daily. Her BP today is 148/80 but she did not take her medications this morning.She takes potassium supplement for hypokalemia.   She finds that taking calcium supplement with vitamin D 3 tablets daily helps with her joint pain.  Patient had vitamin-D level in toxic range because of excessive vitamin-D replacement in 2019.  Vitamin-D was discontinued except what was in her combination calcium vitamin D supplement.  She never came back for repeat vitamin-D level.  Patient has had 2 recent episodes of vertigo for which she took meclizine OTC.      Eye exam October 2020.  Pap smear September 2014. Mammogram, August 2020.  Bone DEXA scan, October 2016, due again in October 2019 to October 2021.  Colonoscopy November 2018 due again November 2028. Tdap August 2012. Zostavax August 2012.  Prevnar vaccine September 2015.  Pneumovax September 2016.  Influenza vaccine September 2018.     ROS:  GENERAL: Patient denies fever, chills, night sweats. Patient denies weight loss. Patient denies anorexia, fatigue, weakness or swollen glands.  Positive for  SKIN: Patient denies rash or hair loss.  HEENT: Patient denies sore throat, ear pain, hearing loss, nasal congestion, or runny nose. Patient denies visual disturbance, eye irritation or discharge.  LUNGS: Patient denies cough, wheeze or hemoptysis.  CARDIOVASCULAR: Patient denies chest pain, shortness of breath, palpitations, syncope or lower extremity edema.  GI: Patient denies abdominal pain, nausea, vomiting, diarrhea, constipation, blood in stool or melena.  GENITOURINARY: Patient denies pelvic pain, vaginal discharge, itch or odor. Patient denies irregular vaginal bleeding. Patient denies dysuria, frequency, hematuria, nocturia, urgency or incontinence.  BREASTS: Patient denies  breast pain, mass or nipple discharge.  MUSCULOSKELETAL: Patient denies joint swelling, redness or warmth.  NEUROLOGIC: Patient denies headache, vertigo, paresthesias, weakness in limb, dysarthria, dysphagia or abnormality of gait.  PSYCHIATRIC: Patient denies anxiety, depression, or memory loss.     OBJECTIVE:   GENERAL: Well-developed well-nourished black female alert and oriented x3, in no acute distress. Weight gain of 3 lb in the last year.  SKIN: Clear without rash.  Normal color and tone.  Sebaceous cyst, posterior neck.  HEENT: Eyes: Clear conjunctivae. No scleral icterus. Pupils equal reactive to light and accommodation. Ears: Clear TMs. Clear canals. Nose: Without congestion. Pharynx: Without injection or exudates.    NECK: Supple, normal range of motion. No masses, lymphadenopathy or enlarged thyroid. No JVD. Carotids 2+ and equal. No bruits.    LUNGS: Clear to auscultation. Normal respiratory effort.    CARDIOVASCULAR: Regular rhythm, normal S1, S2 without murmur, gallop or rub.    BACK: No CVA or spinal tenderness.    BREASTS: No masses, tenderness or nipple discharge.    ABDOMEN: Normal appearance. Active bowel sounds. Soft, nontender without mass or organomegaly. No rebound or guarding.    EXTREMITIES: Without cyanosis, clubbing or edema. Distal pulses 2+ and equal. Normal range of motion in all extremities. No joint effusion, erythema or warmth. Bunions bilaterally, left greater than right.  Right knee enlargement greater than left.  NEUROLOGIC: Cranial nerves II through XII without deficit. Motor strength equal bilaterally. Sensation normal to touch. Deep tendon reflexes 2+ and equal. Gait without abnormality. No tremor. Negative cerebellar signs.    PELVIC: External: Without lesions or inflammation. Vaginal: Atrophic changes. Cervix: Normal appearance, nontender. No Pap. Uterus: Normal size and shape. Adnexa: Non-tender, without masses. Rectovaginal: Confirms, heme-negative stool x2.       ASSESSMENT:  1. Essential hypertension    2. Hyperlipidemia, unspecified hyperlipidemia type    3. Vertigo    4. Osteopenia, unspecified location    5. Hypervitaminosis D        PLAN:  1.  Exercise regularly.  2.  Age-appropriate counseling.  3.  Fasting lab.  4.  Refill medications.  5.  Influenza vaccine.  6.  Follow-up annually.    This note is generated with speech recognition software and is subject to transcription error and sound alike phrases that may be missed by proofreading.

## 2020-11-02 NOTE — Clinical Note
Your patient was seen today for a HRA visit. I have included a copy of my visit note, please review the note and feel free to contact me with any questions.   Thank you for allowing me to participate in the care of your patients.   Delicia Rehman NP

## 2020-11-02 NOTE — PROGRESS NOTES
"  Josy Rehman presented for a  Medicare AWV and comprehensive Health Risk Assessment today. The following components were reviewed and updated:    · Medical history  · Family History  · Social history  · Allergies and Current Medications  · Health Risk Assessment  · Health Maintenance  · Care Team     ** See Completed Assessments for Annual Wellness Visit within the encounter summary.**         The following assessments were completed:  · Living Situation  · CAGE  · Depression Screening  · Timed Get Up and Go  · Whisper Test  · Cognitive Function Screening  · Nutrition Screening  · ADL Screening  · PAQ Screening        Vitals:    11/02/20 0908   BP: (!) 148/80   Pulse: 72   Temp: 97.5 °F (36.4 °C)   SpO2: 98%   Weight: 52.1 kg (114 lb 13.8 oz)   Height: 5' 2" (1.575 m)     Body mass index is 21.01 kg/m².  Physical Exam  Vitals signs and nursing note reviewed.   Constitutional:       Appearance: Normal appearance. She is well-developed.   HENT:      Head: Normocephalic and atraumatic.   Eyes:      Pupils: Pupils are equal, round, and reactive to light.   Neck:      Vascular: No carotid bruit.   Cardiovascular:      Rate and Rhythm: Normal rate and regular rhythm.      Pulses: Normal pulses.      Heart sounds: Normal heart sounds. No murmur. No gallop.    Pulmonary:      Effort: Pulmonary effort is normal.      Breath sounds: Normal breath sounds.   Abdominal:      General: Bowel sounds are normal. There is no distension.      Palpations: Abdomen is soft.      Tenderness: There is no abdominal tenderness.   Musculoskeletal: Normal range of motion.         General: No tenderness.   Skin:     General: Skin is warm and dry.   Neurological:      Mental Status: She is alert.      Motor: No abnormal muscle tone.   Psychiatric:         Speech: Speech normal.         Behavior: Behavior normal.         Thought Content: Thought content normal.         Judgment: Judgment normal.             Current Outpatient Medications:     " calcium carbonate (OS-KHOI) 600 mg calcium (1,500 mg) Tab, Take 600 mg by mouth once daily. , Disp: , Rfl:     hydroCHLOROthiazide (MICROZIDE) 12.5 mg capsule, TAKE 1 CAPSULE BY MOUTH ONCE DAILY, Disp: 90 capsule, Rfl: 0    iron 18 mg Tab, Take by mouth., Disp: , Rfl:     potassium chloride SA (K-DUR,KLOR-CON) 10 MEQ tablet, TAKE 1 TABLET BY MOUTH ONCE DAILY, Disp: 90 tablet, Rfl: 0    vitamin D (VITAMIN D3) 1000 units Tab, Take 1,000 Units by mouth 2 (two) times daily., Disp: , Rfl:       Diagnoses and health risks identified today and associated recommendations/orders:    1. Encounter for preventive health examination  Completed= Flu vaccine today with PCP    2. Essential hypertension  Chronic and Stable on Microzide- slightly elevated - no meds taken today due to fasting. Continue current treatment plan as previously prescribed with your PCP    3. Hyperlipidemia, unspecified hyperlipidemia type  Chronic and Stable. FLP todat  Continue current treatment plan as previously prescribed with your PCP    4. Primary osteoarthritis of both knees  Chronic and Stable. Continue current treatment plan as previously prescribed with your PCP    5. Osteopenia, unspecified location  DEXA SCAN 10/03/2021 10/3/2016   Chronic and Stable on vitamin D . Continue current treatment plan as previously prescribed with your PCP    6. Vitamin D deficiency disease  Chronic and Stable on Vitamin DF . Continue current treatment plan as previously prescribed with your PCP    7. Vertigo  Chronic and Stable. States she had an episode 2 months ago -relieved with OTC meds Continue current treatment plan as previously prescribed with your PCP    8. Age-related cataract of both eyes, unspecified age-related cataract type  Chronic and Ongoing. Waiting to be schedule for surgery. Followed by optholomigist     I offered to discuss end of life issues, including information on how to make advance directives that the patient could use to name someone  who would make medical decisions on their behalf if they became too ill to make themselves.  _X_Patient is interested, I provided paper work and offered to kristen Rubiny with a 5-10 year written screening schedule and personal prevention plan. Recommendations were developed using the USPSTF age appropriate recommendations. Education, counseling, and referrals were provided as needed. After Visit Summary printed and given to patient which includes a list of additional screenings\tests needed.    Follow up in about 1 year (around 11/2/2021).    Delicia Rehman NP

## 2020-11-02 NOTE — PATIENT INSTRUCTIONS
Counseling and Referral of Other Preventative  (Italic type indicates deductible and co-insurance are waived)    Patient Name: Josy Rehman  Today's Date: 11/2/2020    Health Maintenance       Date Due Completion Date    Shingles Vaccine (2 of 3) 10/08/2012 8/13/2012    Influenza Vaccine (1) 08/01/2020 9/28/2018    Lipid Panel 10/31/2020 10/31/2019    Mammogram 08/05/2021 8/5/2020    DEXA SCAN 10/03/2021 10/3/2016    Override on 8/24/2012: Done (REPEAT 3 YRS)    TETANUS VACCINE 08/13/2022 8/13/2012    Colorectal Cancer Screening 11/21/2028 11/21/2018        No orders of the defined types were placed in this encounter.    The following information is provided to all patients.  This information is to help you find resources for any of the problems found today that may be affecting your health:                Living healthy guide: www.[facility].[place].[facility]      Understanding Diabetes: www.diabetes.org      Eating healthy: www.cdc.gov/healthyweight      [facility] home safety checklist: www.cdc.gov/steadi/patient.html      Agency on Aging: www.goea.[place]      Alcoholics anonymous (AA): www.aa.org      Physical Activity: www.cindy.nih.gov/za6zhlf      Tobacco use: www.quitwithusla.org     Counseling and Referral of Other Preventative  (Italic type indicates deductible and co-insurance are waived)    Patient Name: Josy Rehman  Today's Date: 11/2/2020    Health Maintenance       Date Due Completion Date    Shingles Vaccine (2 of 3) 10/08/2012 8/13/2012    Influenza Vaccine (1) 08/01/2020 9/28/2018    Lipid Panel 10/31/2020 10/31/2019    Mammogram 08/05/2021 8/5/2020    DEXA SCAN 10/03/2021 10/3/2016    Override on 8/24/2012: Done (REPEAT 3 YRS)    TETANUS VACCINE 08/13/2022 8/13/2012    Colorectal Cancer Screening 11/21/2028 11/21/2018        No orders of the defined types were placed in this encounter.    The following information is provided to all patients.  This information is to help you find resources for any of the  problems found today that may be affecting your health:                Living healthy guide: www.[facility].[place].[facility]      Understanding Diabetes: www.diabetes.org      Eating healthy: www.cdc.gov/healthyweight      CDC home safety checklist: www.cdc.gov/steadi/patient.html      Agency on Aging: www.goea.[place]      Alcoholics anonymous (AA): www.aa.org      Physical Activity: www.cindy.nih.gov/jk9fvyu      Tobacco use: www.quitwithusla.org

## 2020-11-03 LAB
25(OH)D3+25(OH)D2 SERPL-MCNC: 85 NG/ML (ref 30–96)
ALBUMIN SERPL BCP-MCNC: 4.1 G/DL (ref 3.5–5.2)
ALP SERPL-CCNC: 45 U/L (ref 55–135)
ALT SERPL W/O P-5'-P-CCNC: 12 U/L (ref 10–44)
ANION GAP SERPL CALC-SCNC: 14 MMOL/L (ref 8–16)
AST SERPL-CCNC: 24 U/L (ref 10–40)
BASOPHILS # BLD AUTO: 0.09 K/UL (ref 0–0.2)
BASOPHILS NFR BLD: 1.6 % (ref 0–1.9)
BILIRUB SERPL-MCNC: 0.6 MG/DL (ref 0.1–1)
BUN SERPL-MCNC: 15 MG/DL (ref 8–23)
CALCIUM SERPL-MCNC: 10.1 MG/DL (ref 8.7–10.5)
CHLORIDE SERPL-SCNC: 105 MMOL/L (ref 95–110)
CHOLEST SERPL-MCNC: 200 MG/DL (ref 120–199)
CHOLEST/HDLC SERPL: 3.3 {RATIO} (ref 2–5)
CO2 SERPL-SCNC: 21 MMOL/L (ref 23–29)
CREAT SERPL-MCNC: 1.1 MG/DL (ref 0.5–1.4)
DIFFERENTIAL METHOD: ABNORMAL
EOSINOPHIL # BLD AUTO: 0.1 K/UL (ref 0–0.5)
EOSINOPHIL NFR BLD: 2 % (ref 0–8)
ERYTHROCYTE [DISTWIDTH] IN BLOOD BY AUTOMATED COUNT: 12.4 % (ref 11.5–14.5)
EST. GFR  (AFRICAN AMERICAN): 58.4 ML/MIN/1.73 M^2
EST. GFR  (NON AFRICAN AMERICAN): 50.6 ML/MIN/1.73 M^2
GLUCOSE SERPL-MCNC: 75 MG/DL (ref 70–110)
HCT VFR BLD AUTO: 42 % (ref 37–48.5)
HDLC SERPL-MCNC: 60 MG/DL (ref 40–75)
HDLC SERPL: 30 % (ref 20–50)
HGB BLD-MCNC: 12.6 G/DL (ref 12–16)
IMM GRANULOCYTES # BLD AUTO: 0 K/UL (ref 0–0.04)
IMM GRANULOCYTES NFR BLD AUTO: 0 % (ref 0–0.5)
LDLC SERPL CALC-MCNC: 125.4 MG/DL (ref 63–159)
LYMPHOCYTES # BLD AUTO: 2.3 K/UL (ref 1–4.8)
LYMPHOCYTES NFR BLD: 41 % (ref 18–48)
MCH RBC QN AUTO: 28.1 PG (ref 27–31)
MCHC RBC AUTO-ENTMCNC: 30 G/DL (ref 32–36)
MCV RBC AUTO: 94 FL (ref 82–98)
MONOCYTES # BLD AUTO: 0.6 K/UL (ref 0.3–1)
MONOCYTES NFR BLD: 10.2 % (ref 4–15)
NEUTROPHILS # BLD AUTO: 2.5 K/UL (ref 1.8–7.7)
NEUTROPHILS NFR BLD: 45.2 % (ref 38–73)
NONHDLC SERPL-MCNC: 140 MG/DL
NRBC BLD-RTO: 0 /100 WBC
PLATELET # BLD AUTO: 300 K/UL (ref 150–350)
PMV BLD AUTO: 10.7 FL (ref 9.2–12.9)
POTASSIUM SERPL-SCNC: 4.1 MMOL/L (ref 3.5–5.1)
PROT SERPL-MCNC: 7.8 G/DL (ref 6–8.4)
RBC # BLD AUTO: 4.49 M/UL (ref 4–5.4)
SODIUM SERPL-SCNC: 140 MMOL/L (ref 136–145)
TRIGL SERPL-MCNC: 73 MG/DL (ref 30–150)
TSH SERPL DL<=0.005 MIU/L-ACNC: 0.64 UIU/ML (ref 0.4–4)
WBC # BLD AUTO: 5.51 K/UL (ref 3.9–12.7)

## 2020-12-11 ENCOUNTER — PATIENT MESSAGE (OUTPATIENT)
Dept: OTHER | Facility: OTHER | Age: 70
End: 2020-12-11

## 2021-05-31 ENCOUNTER — TELEPHONE (OUTPATIENT)
Dept: PRIMARY CARE CLINIC | Facility: CLINIC | Age: 71
End: 2021-05-31

## 2021-05-31 DIAGNOSIS — Z12.31 ENCOUNTER FOR SCREENING MAMMOGRAM FOR MALIGNANT NEOPLASM OF BREAST: Primary | ICD-10-CM

## 2021-08-06 ENCOUNTER — HOSPITAL ENCOUNTER (OUTPATIENT)
Dept: RADIOLOGY | Facility: HOSPITAL | Age: 71
Discharge: HOME | End: 2021-08-06
Attending: FAMILY MEDICINE
Payer: MEDICARE

## 2021-08-06 DIAGNOSIS — Z12.31 ENCOUNTER FOR SCREENING MAMMOGRAM FOR MALIGNANT NEOPLASM OF BREAST: ICD-10-CM

## 2021-08-06 PROCEDURE — 77063 BREAST TOMOSYNTHESIS BI: CPT | Mod: 26,,, | Performed by: RADIOLOGY

## 2021-08-06 PROCEDURE — 77067 MAMMO DIGITAL SCREENING BILAT WITH TOMO: ICD-10-PCS | Mod: 26,,, | Performed by: RADIOLOGY

## 2021-08-06 PROCEDURE — 77063 MAMMO DIGITAL SCREENING BILAT WITH TOMO: ICD-10-PCS | Mod: 26,,, | Performed by: RADIOLOGY

## 2021-08-06 PROCEDURE — 77067 SCR MAMMO BI INCL CAD: CPT | Mod: TC

## 2021-08-06 PROCEDURE — 77067 SCR MAMMO BI INCL CAD: CPT | Mod: 26,,, | Performed by: RADIOLOGY

## 2021-10-13 ENCOUNTER — PATIENT OUTREACH (OUTPATIENT)
Dept: ADMINISTRATIVE | Facility: OTHER | Age: 71
End: 2021-10-13

## 2021-10-14 ENCOUNTER — OFFICE VISIT (OUTPATIENT)
Dept: OPHTHALMOLOGY | Facility: CLINIC | Age: 71
End: 2021-10-14
Payer: MEDICARE

## 2021-10-14 DIAGNOSIS — H25.12 NUCLEAR SCLEROSIS OF LEFT EYE: ICD-10-CM

## 2021-10-14 DIAGNOSIS — H25.11 NUCLEAR SCLEROSIS OF RIGHT EYE: Primary | ICD-10-CM

## 2021-10-14 DIAGNOSIS — I10 ESSENTIAL HYPERTENSION: ICD-10-CM

## 2021-10-14 PROCEDURE — 92136 OPHTHALMIC BIOMETRY: CPT | Mod: PBBFAC | Performed by: STUDENT IN AN ORGANIZED HEALTH CARE EDUCATION/TRAINING PROGRAM

## 2021-10-14 PROCEDURE — 92136 IOL MASTER - OD - RIGHT EYE: ICD-10-PCS | Mod: 26,S$PBB,RT, | Performed by: STUDENT IN AN ORGANIZED HEALTH CARE EDUCATION/TRAINING PROGRAM

## 2021-10-14 PROCEDURE — 99999 PR PBB SHADOW E&M-EST. PATIENT-LVL II: CPT | Mod: PBBFAC,,, | Performed by: STUDENT IN AN ORGANIZED HEALTH CARE EDUCATION/TRAINING PROGRAM

## 2021-10-14 PROCEDURE — 92004 PR EYE EXAM, NEW PATIENT,COMPREHESV: ICD-10-PCS | Mod: S$PBB,,, | Performed by: STUDENT IN AN ORGANIZED HEALTH CARE EDUCATION/TRAINING PROGRAM

## 2021-10-14 PROCEDURE — 99999 PR PBB SHADOW E&M-EST. PATIENT-LVL II: ICD-10-PCS | Mod: PBBFAC,,, | Performed by: STUDENT IN AN ORGANIZED HEALTH CARE EDUCATION/TRAINING PROGRAM

## 2021-10-14 PROCEDURE — 92025 CORNEAL TOPOGRAPHY - OU - BOTH EYES: ICD-10-PCS | Mod: 26,S$PBB,, | Performed by: STUDENT IN AN ORGANIZED HEALTH CARE EDUCATION/TRAINING PROGRAM

## 2021-10-14 PROCEDURE — 92025 CPTRIZED CORNEAL TOPOGRAPHY: CPT | Mod: PBBFAC | Performed by: STUDENT IN AN ORGANIZED HEALTH CARE EDUCATION/TRAINING PROGRAM

## 2021-10-14 PROCEDURE — 99212 OFFICE O/P EST SF 10 MIN: CPT | Mod: PBBFAC | Performed by: STUDENT IN AN ORGANIZED HEALTH CARE EDUCATION/TRAINING PROGRAM

## 2021-10-14 PROCEDURE — 92004 COMPRE OPH EXAM NEW PT 1/>: CPT | Mod: S$PBB,,, | Performed by: STUDENT IN AN ORGANIZED HEALTH CARE EDUCATION/TRAINING PROGRAM

## 2021-11-04 ENCOUNTER — DOCUMENTATION ONLY (OUTPATIENT)
Dept: OPHTHALMOLOGY | Facility: CLINIC | Age: 71
End: 2021-11-04
Payer: MEDICARE

## 2021-11-04 ENCOUNTER — OFFICE VISIT (OUTPATIENT)
Dept: FAMILY MEDICINE | Facility: CLINIC | Age: 71
End: 2021-11-04
Payer: MEDICARE

## 2021-11-04 ENCOUNTER — LAB VISIT (OUTPATIENT)
Dept: LAB | Facility: HOSPITAL | Age: 71
End: 2021-11-04
Payer: MEDICARE

## 2021-11-04 VITALS
TEMPERATURE: 98 F | HEART RATE: 65 BPM | WEIGHT: 101.44 LBS | SYSTOLIC BLOOD PRESSURE: 130 MMHG | HEIGHT: 62 IN | DIASTOLIC BLOOD PRESSURE: 80 MMHG | OXYGEN SATURATION: 98 % | RESPIRATION RATE: 18 BRPM | BODY MASS INDEX: 18.67 KG/M2

## 2021-11-04 DIAGNOSIS — I10 ESSENTIAL HYPERTENSION: Primary | Chronic | ICD-10-CM

## 2021-11-04 DIAGNOSIS — R63.4 ABNORMAL WEIGHT LOSS: ICD-10-CM

## 2021-11-04 DIAGNOSIS — Z78.0 POSTMENOPAUSAL: ICD-10-CM

## 2021-11-04 DIAGNOSIS — E78.5 HYPERLIPIDEMIA, UNSPECIFIED HYPERLIPIDEMIA TYPE: ICD-10-CM

## 2021-11-04 DIAGNOSIS — M25.521 RIGHT ELBOW PAIN: ICD-10-CM

## 2021-11-04 DIAGNOSIS — Z01.419 WELL FEMALE EXAM WITH ROUTINE GYNECOLOGICAL EXAM: ICD-10-CM

## 2021-11-04 DIAGNOSIS — M85.80 OSTEOPENIA, UNSPECIFIED LOCATION: ICD-10-CM

## 2021-11-04 DIAGNOSIS — I10 ESSENTIAL HYPERTENSION: Chronic | ICD-10-CM

## 2021-11-04 PROCEDURE — 99999 PR PBB SHADOW E&M-EST. PATIENT-LVL IV: ICD-10-PCS | Mod: PBBFAC,,, | Performed by: FAMILY MEDICINE

## 2021-11-04 PROCEDURE — G0101 PR CA SCREEN;PELVIC/BREAST EXAM: ICD-10-PCS | Mod: S$PBB,,, | Performed by: FAMILY MEDICINE

## 2021-11-04 PROCEDURE — G0008 ADMIN INFLUENZA VIRUS VAC: HCPCS | Mod: PBBFAC,PO

## 2021-11-04 PROCEDURE — 99999 PR PBB SHADOW E&M-EST. PATIENT-LVL IV: CPT | Mod: PBBFAC,,, | Performed by: FAMILY MEDICINE

## 2021-11-04 PROCEDURE — G0101 CA SCREEN;PELVIC/BREAST EXAM: HCPCS | Mod: S$PBB,,, | Performed by: FAMILY MEDICINE

## 2021-11-04 PROCEDURE — 99214 OFFICE O/P EST MOD 30 MIN: CPT | Mod: 25,S$PBB,, | Performed by: FAMILY MEDICINE

## 2021-11-04 PROCEDURE — 85025 COMPLETE CBC W/AUTO DIFF WBC: CPT | Performed by: FAMILY MEDICINE

## 2021-11-04 PROCEDURE — 80061 LIPID PANEL: CPT | Performed by: FAMILY MEDICINE

## 2021-11-04 PROCEDURE — 84443 ASSAY THYROID STIM HORMONE: CPT | Performed by: FAMILY MEDICINE

## 2021-11-04 PROCEDURE — 99214 PR OFFICE/OUTPT VISIT, EST, LEVL IV, 30-39 MIN: ICD-10-PCS | Mod: 25,S$PBB,, | Performed by: FAMILY MEDICINE

## 2021-11-04 PROCEDURE — 36415 COLL VENOUS BLD VENIPUNCTURE: CPT | Mod: PO | Performed by: FAMILY MEDICINE

## 2021-11-04 PROCEDURE — 99214 OFFICE O/P EST MOD 30 MIN: CPT | Mod: PBBFAC,PO,25 | Performed by: FAMILY MEDICINE

## 2021-11-04 PROCEDURE — G0101 CA SCREEN;PELVIC/BREAST EXAM: HCPCS | Mod: PBBFAC,PO,RT | Performed by: FAMILY MEDICINE

## 2021-11-04 PROCEDURE — 90694 VACC AIIV4 NO PRSRV 0.5ML IM: CPT | Mod: PBBFAC,PO

## 2021-11-04 PROCEDURE — 80053 COMPREHEN METABOLIC PANEL: CPT | Performed by: FAMILY MEDICINE

## 2021-11-04 RX ORDER — DICYCLOMINE HYDROCHLORIDE 20 MG/1
20 TABLET ORAL
Qty: 60 TABLET | Refills: 2 | Status: SHIPPED | OUTPATIENT
Start: 2021-11-04 | End: 2022-11-17

## 2021-11-04 RX ORDER — HYDROCHLOROTHIAZIDE 12.5 MG/1
12.5 CAPSULE ORAL DAILY
Qty: 90 CAPSULE | Refills: 3 | Status: SHIPPED | OUTPATIENT
Start: 2021-11-04 | End: 2022-11-17 | Stop reason: SDUPTHER

## 2021-11-04 RX ORDER — POTASSIUM CHLORIDE 750 MG/1
10 TABLET, EXTENDED RELEASE ORAL DAILY
Qty: 90 TABLET | Refills: 3 | Status: SHIPPED | OUTPATIENT
Start: 2021-11-04 | End: 2022-11-13

## 2021-11-05 LAB
ALBUMIN SERPL BCP-MCNC: 4.3 G/DL (ref 3.5–5.2)
ALP SERPL-CCNC: 42 U/L (ref 55–135)
ALT SERPL W/O P-5'-P-CCNC: 13 U/L (ref 10–44)
ANION GAP SERPL CALC-SCNC: 14 MMOL/L (ref 8–16)
AST SERPL-CCNC: 24 U/L (ref 10–40)
BASOPHILS # BLD AUTO: 0.07 K/UL (ref 0–0.2)
BASOPHILS NFR BLD: 1.1 % (ref 0–1.9)
BILIRUB SERPL-MCNC: 0.6 MG/DL (ref 0.1–1)
BUN SERPL-MCNC: 13 MG/DL (ref 8–23)
CALCIUM SERPL-MCNC: 10.1 MG/DL (ref 8.7–10.5)
CHLORIDE SERPL-SCNC: 103 MMOL/L (ref 95–110)
CHOLEST SERPL-MCNC: 214 MG/DL (ref 120–199)
CHOLEST/HDLC SERPL: 2.8 {RATIO} (ref 2–5)
CO2 SERPL-SCNC: 24 MMOL/L (ref 23–29)
CREAT SERPL-MCNC: 0.9 MG/DL (ref 0.5–1.4)
DIFFERENTIAL METHOD: ABNORMAL
EOSINOPHIL # BLD AUTO: 0.2 K/UL (ref 0–0.5)
EOSINOPHIL NFR BLD: 3.4 % (ref 0–8)
ERYTHROCYTE [DISTWIDTH] IN BLOOD BY AUTOMATED COUNT: 12.9 % (ref 11.5–14.5)
EST. GFR  (AFRICAN AMERICAN): >60 ML/MIN/1.73 M^2
EST. GFR  (NON AFRICAN AMERICAN): >60 ML/MIN/1.73 M^2
GLUCOSE SERPL-MCNC: 78 MG/DL (ref 70–110)
HCT VFR BLD AUTO: 41.8 % (ref 37–48.5)
HDLC SERPL-MCNC: 76 MG/DL (ref 40–75)
HDLC SERPL: 35.5 % (ref 20–50)
HGB BLD-MCNC: 12.9 G/DL (ref 12–16)
IMM GRANULOCYTES # BLD AUTO: 0.01 K/UL (ref 0–0.04)
IMM GRANULOCYTES NFR BLD AUTO: 0.2 % (ref 0–0.5)
LDLC SERPL CALC-MCNC: 123.2 MG/DL (ref 63–159)
LYMPHOCYTES # BLD AUTO: 2.2 K/UL (ref 1–4.8)
LYMPHOCYTES NFR BLD: 34.3 % (ref 18–48)
MCH RBC QN AUTO: 28.8 PG (ref 27–31)
MCHC RBC AUTO-ENTMCNC: 30.9 G/DL (ref 32–36)
MCV RBC AUTO: 93 FL (ref 82–98)
MONOCYTES # BLD AUTO: 0.6 K/UL (ref 0.3–1)
MONOCYTES NFR BLD: 8.8 % (ref 4–15)
NEUTROPHILS # BLD AUTO: 3.3 K/UL (ref 1.8–7.7)
NEUTROPHILS NFR BLD: 52.2 % (ref 38–73)
NONHDLC SERPL-MCNC: 138 MG/DL
NRBC BLD-RTO: 0 /100 WBC
PLATELET # BLD AUTO: 295 K/UL (ref 150–450)
PMV BLD AUTO: 10.2 FL (ref 9.2–12.9)
POTASSIUM SERPL-SCNC: 3.5 MMOL/L (ref 3.5–5.1)
PROT SERPL-MCNC: 8.3 G/DL (ref 6–8.4)
RBC # BLD AUTO: 4.48 M/UL (ref 4–5.4)
SODIUM SERPL-SCNC: 141 MMOL/L (ref 136–145)
TRIGL SERPL-MCNC: 74 MG/DL (ref 30–150)
TSH SERPL DL<=0.005 MIU/L-ACNC: 1.19 UIU/ML (ref 0.4–4)
WBC # BLD AUTO: 6.26 K/UL (ref 3.9–12.7)

## 2021-11-10 ENCOUNTER — OUTSIDE PLACE OF SERVICE (OUTPATIENT)
Dept: OPHTHALMOLOGY | Facility: CLINIC | Age: 71
End: 2021-11-10
Payer: MEDICARE

## 2021-11-10 PROCEDURE — 66984 XCAPSL CTRC RMVL W/O ECP: CPT | Mod: RT,,, | Performed by: STUDENT IN AN ORGANIZED HEALTH CARE EDUCATION/TRAINING PROGRAM

## 2021-11-10 PROCEDURE — 66984 PR REMOVAL, CATARACT, W/INSRT INTRAOC LENS, W/O ENDO CYCLO: ICD-10-PCS | Mod: RT,,, | Performed by: STUDENT IN AN ORGANIZED HEALTH CARE EDUCATION/TRAINING PROGRAM

## 2021-11-11 ENCOUNTER — OFFICE VISIT (OUTPATIENT)
Dept: OPHTHALMOLOGY | Facility: CLINIC | Age: 71
End: 2021-11-11
Payer: MEDICARE

## 2021-11-11 DIAGNOSIS — Z98.890 POST-OPERATIVE STATE: Primary | ICD-10-CM

## 2021-11-11 DIAGNOSIS — Z98.41 CATARACT EXTRACTION STATUS OF EYE, RIGHT: ICD-10-CM

## 2021-11-11 PROCEDURE — 99024 POSTOP FOLLOW-UP VISIT: CPT | Mod: POP,,, | Performed by: STUDENT IN AN ORGANIZED HEALTH CARE EDUCATION/TRAINING PROGRAM

## 2021-11-11 PROCEDURE — 99999 PR PBB SHADOW E&M-EST. PATIENT-LVL II: CPT | Mod: PBBFAC,,, | Performed by: STUDENT IN AN ORGANIZED HEALTH CARE EDUCATION/TRAINING PROGRAM

## 2021-11-11 PROCEDURE — 99024 PR POST-OP FOLLOW-UP VISIT: ICD-10-PCS | Mod: POP,,, | Performed by: STUDENT IN AN ORGANIZED HEALTH CARE EDUCATION/TRAINING PROGRAM

## 2021-11-11 PROCEDURE — 99212 OFFICE O/P EST SF 10 MIN: CPT | Mod: PBBFAC | Performed by: STUDENT IN AN ORGANIZED HEALTH CARE EDUCATION/TRAINING PROGRAM

## 2021-11-11 PROCEDURE — 99999 PR PBB SHADOW E&M-EST. PATIENT-LVL II: ICD-10-PCS | Mod: PBBFAC,,, | Performed by: STUDENT IN AN ORGANIZED HEALTH CARE EDUCATION/TRAINING PROGRAM

## 2021-11-11 RX ORDER — PREDNISOLONE ACETATE 10 MG/ML
1 SUSPENSION/ DROPS OPHTHALMIC 4 TIMES DAILY
Qty: 5 ML | Refills: 1 | Status: SHIPPED | OUTPATIENT
Start: 2021-11-11 | End: 2021-11-22 | Stop reason: SDUPTHER

## 2021-11-11 RX ORDER — BRIMONIDINE TARTRATE 2 MG/ML
1 SOLUTION/ DROPS OPHTHALMIC 2 TIMES DAILY
Qty: 5 ML | Refills: 0 | Status: SHIPPED | OUTPATIENT
Start: 2021-11-11 | End: 2022-11-17

## 2021-11-17 ENCOUNTER — OFFICE VISIT (OUTPATIENT)
Dept: OPHTHALMOLOGY | Facility: CLINIC | Age: 71
End: 2021-11-17
Payer: MEDICARE

## 2021-11-17 DIAGNOSIS — Z98.890 POST-OPERATIVE STATE: Primary | ICD-10-CM

## 2021-11-17 DIAGNOSIS — Z98.41 CATARACT EXTRACTION STATUS OF EYE, RIGHT: ICD-10-CM

## 2021-11-17 DIAGNOSIS — H25.12 NUCLEAR SCLEROSIS OF LEFT EYE: ICD-10-CM

## 2021-11-17 PROCEDURE — 99024 POSTOP FOLLOW-UP VISIT: CPT | Mod: POP,,, | Performed by: STUDENT IN AN ORGANIZED HEALTH CARE EDUCATION/TRAINING PROGRAM

## 2021-11-17 PROCEDURE — 99212 OFFICE O/P EST SF 10 MIN: CPT | Mod: PBBFAC | Performed by: STUDENT IN AN ORGANIZED HEALTH CARE EDUCATION/TRAINING PROGRAM

## 2021-11-17 PROCEDURE — 99999 PR PBB SHADOW E&M-EST. PATIENT-LVL II: ICD-10-PCS | Mod: PBBFAC,,, | Performed by: STUDENT IN AN ORGANIZED HEALTH CARE EDUCATION/TRAINING PROGRAM

## 2021-11-17 PROCEDURE — 99024 PR POST-OP FOLLOW-UP VISIT: ICD-10-PCS | Mod: POP,,, | Performed by: STUDENT IN AN ORGANIZED HEALTH CARE EDUCATION/TRAINING PROGRAM

## 2021-11-17 PROCEDURE — 99999 PR PBB SHADOW E&M-EST. PATIENT-LVL II: CPT | Mod: PBBFAC,,, | Performed by: STUDENT IN AN ORGANIZED HEALTH CARE EDUCATION/TRAINING PROGRAM

## 2021-11-19 ENCOUNTER — TELEPHONE (OUTPATIENT)
Dept: OPHTHALMOLOGY | Facility: CLINIC | Age: 71
End: 2021-11-19
Payer: MEDICARE

## 2021-11-22 DIAGNOSIS — Z98.890 POST-OPERATIVE STATE: ICD-10-CM

## 2021-11-22 DIAGNOSIS — Z98.41 CATARACT EXTRACTION STATUS OF EYE, RIGHT: ICD-10-CM

## 2021-11-22 RX ORDER — PREDNISOLONE ACETATE 10 MG/ML
1 SUSPENSION/ DROPS OPHTHALMIC 4 TIMES DAILY
Qty: 5 ML | Refills: 1 | Status: SHIPPED | OUTPATIENT
Start: 2021-11-22 | End: 2022-02-11

## 2021-11-24 ENCOUNTER — PES CALL (OUTPATIENT)
Dept: ADMINISTRATIVE | Facility: CLINIC | Age: 71
End: 2021-11-24
Payer: MEDICARE

## 2021-12-21 ENCOUNTER — DOCUMENTATION ONLY (OUTPATIENT)
Dept: OPHTHALMOLOGY | Facility: CLINIC | Age: 71
End: 2021-12-21

## 2021-12-21 ENCOUNTER — OFFICE VISIT (OUTPATIENT)
Dept: OPHTHALMOLOGY | Facility: CLINIC | Age: 71
End: 2021-12-21
Payer: MEDICARE

## 2021-12-21 DIAGNOSIS — Z98.890 POST-OPERATIVE STATE: Primary | ICD-10-CM

## 2021-12-21 DIAGNOSIS — H25.12 NUCLEAR SCLEROSIS OF LEFT EYE: ICD-10-CM

## 2021-12-21 DIAGNOSIS — Z98.41 CATARACT EXTRACTION STATUS OF EYE, RIGHT: ICD-10-CM

## 2021-12-21 PROCEDURE — 92136 OPHTHALMIC BIOMETRY: CPT | Mod: PBBFAC,LT | Performed by: STUDENT IN AN ORGANIZED HEALTH CARE EDUCATION/TRAINING PROGRAM

## 2021-12-21 PROCEDURE — 99213 OFFICE O/P EST LOW 20 MIN: CPT | Mod: PBBFAC | Performed by: STUDENT IN AN ORGANIZED HEALTH CARE EDUCATION/TRAINING PROGRAM

## 2021-12-21 PROCEDURE — 99999 PR PBB SHADOW E&M-EST. PATIENT-LVL III: CPT | Mod: PBBFAC,,, | Performed by: STUDENT IN AN ORGANIZED HEALTH CARE EDUCATION/TRAINING PROGRAM

## 2021-12-21 PROCEDURE — 99999 PR PBB SHADOW E&M-EST. PATIENT-LVL III: ICD-10-PCS | Mod: PBBFAC,,, | Performed by: STUDENT IN AN ORGANIZED HEALTH CARE EDUCATION/TRAINING PROGRAM

## 2021-12-21 PROCEDURE — 92136 IOL MASTER - OS - LEFT EYE: ICD-10-PCS | Mod: 26,S$PBB,LT, | Performed by: STUDENT IN AN ORGANIZED HEALTH CARE EDUCATION/TRAINING PROGRAM

## 2021-12-21 PROCEDURE — 99024 POSTOP FOLLOW-UP VISIT: CPT | Mod: POP,,, | Performed by: STUDENT IN AN ORGANIZED HEALTH CARE EDUCATION/TRAINING PROGRAM

## 2021-12-21 PROCEDURE — 99024 PR POST-OP FOLLOW-UP VISIT: ICD-10-PCS | Mod: POP,,, | Performed by: STUDENT IN AN ORGANIZED HEALTH CARE EDUCATION/TRAINING PROGRAM

## 2021-12-21 RX ORDER — PREDNISOLONE ACETATE 10 MG/ML
1 SUSPENSION/ DROPS OPHTHALMIC 4 TIMES DAILY
Qty: 5 ML | Refills: 1 | Status: SHIPPED | OUTPATIENT
Start: 2021-12-21 | End: 2022-01-15

## 2022-01-06 ENCOUNTER — OUTSIDE PLACE OF SERVICE (OUTPATIENT)
Dept: OPHTHALMOLOGY | Facility: CLINIC | Age: 72
End: 2022-01-06
Payer: MEDICARE

## 2022-01-06 PROCEDURE — 66984 PR REMOVAL, CATARACT, W/INSRT INTRAOC LENS, W/O ENDO CYCLO: ICD-10-PCS | Mod: 79,LT,, | Performed by: STUDENT IN AN ORGANIZED HEALTH CARE EDUCATION/TRAINING PROGRAM

## 2022-01-06 PROCEDURE — 66984 XCAPSL CTRC RMVL W/O ECP: CPT | Mod: 79,LT,, | Performed by: STUDENT IN AN ORGANIZED HEALTH CARE EDUCATION/TRAINING PROGRAM

## 2022-01-07 ENCOUNTER — OFFICE VISIT (OUTPATIENT)
Dept: OPHTHALMOLOGY | Facility: CLINIC | Age: 72
End: 2022-01-07
Payer: MEDICARE

## 2022-01-07 DIAGNOSIS — Z98.42 CATARACT EXTRACTION STATUS OF EYE, LEFT: ICD-10-CM

## 2022-01-07 DIAGNOSIS — Z98.890 POST-OPERATIVE STATE: Primary | ICD-10-CM

## 2022-01-07 PROCEDURE — 99999 PR PBB SHADOW E&M-EST. PATIENT-LVL II: ICD-10-PCS | Mod: PBBFAC,,, | Performed by: STUDENT IN AN ORGANIZED HEALTH CARE EDUCATION/TRAINING PROGRAM

## 2022-01-07 PROCEDURE — 99999 PR PBB SHADOW E&M-EST. PATIENT-LVL II: CPT | Mod: PBBFAC,,, | Performed by: STUDENT IN AN ORGANIZED HEALTH CARE EDUCATION/TRAINING PROGRAM

## 2022-01-07 PROCEDURE — 99212 OFFICE O/P EST SF 10 MIN: CPT | Mod: PBBFAC | Performed by: STUDENT IN AN ORGANIZED HEALTH CARE EDUCATION/TRAINING PROGRAM

## 2022-01-07 PROCEDURE — 99024 PR POST-OP FOLLOW-UP VISIT: ICD-10-PCS | Mod: POP,,, | Performed by: STUDENT IN AN ORGANIZED HEALTH CARE EDUCATION/TRAINING PROGRAM

## 2022-01-07 PROCEDURE — 99024 POSTOP FOLLOW-UP VISIT: CPT | Mod: POP,,, | Performed by: STUDENT IN AN ORGANIZED HEALTH CARE EDUCATION/TRAINING PROGRAM

## 2022-01-07 NOTE — PROGRESS NOTES
HPI     Post-op Evaluation      Additional comments: 1 day s/p IOL OS              Comments     DNL REFERRAL    Patient states that she slept well - denies pain/ discomfort OS - using   and tolerating Pred QID OS        1. PCIOL OD 11/10/21  PCIOL OS 01/06/22  2. +HTN    PRED OS QID          Last edited by Ana Fallon MA on 1/7/2022  9:22 AM. (History)            Assessment /Plan     For exam results, see Encounter Report.    Post-operative state  Cataract extraction status of eye, left- POD#1 S/P CEIOL OS Doing well. Mild edema    Continue gtts to operative eye:  PF QID    *IOPn elevated, will monitor    Reinstructed in importance of absolute compliance with Post-OP instructions including medications, shield at bedtime, protective glasses during the day, and limitation of activities. Follow up appointments in approximately one and six weeks or call immediately for increased pain, redness or vision loss.     RTC 1 week. MOCT if PH worse than 20/25

## 2022-01-12 ENCOUNTER — APPOINTMENT (OUTPATIENT)
Dept: RADIOLOGY | Facility: HOSPITAL | Age: 72
End: 2022-01-12
Attending: FAMILY MEDICINE
Payer: MEDICARE

## 2022-01-12 DIAGNOSIS — Z78.0 POSTMENOPAUSAL: ICD-10-CM

## 2022-01-12 PROCEDURE — 77080 DEXA BONE DENSITY SPINE HIP: ICD-10-PCS | Mod: 26,,, | Performed by: RADIOLOGY

## 2022-01-12 PROCEDURE — 77080 DXA BONE DENSITY AXIAL: CPT | Mod: 26,,, | Performed by: RADIOLOGY

## 2022-01-12 PROCEDURE — 77080 DXA BONE DENSITY AXIAL: CPT | Mod: TC

## 2022-01-13 ENCOUNTER — OFFICE VISIT (OUTPATIENT)
Dept: OPHTHALMOLOGY | Facility: CLINIC | Age: 72
End: 2022-01-13
Payer: MEDICARE

## 2022-01-13 DIAGNOSIS — Z98.42 CATARACT EXTRACTION STATUS OF EYE, LEFT: ICD-10-CM

## 2022-01-13 DIAGNOSIS — Z98.890 POST-OPERATIVE STATE: Primary | ICD-10-CM

## 2022-01-13 PROCEDURE — 99999 PR PBB SHADOW E&M-EST. PATIENT-LVL II: CPT | Mod: PBBFAC,,, | Performed by: STUDENT IN AN ORGANIZED HEALTH CARE EDUCATION/TRAINING PROGRAM

## 2022-01-13 PROCEDURE — 99212 OFFICE O/P EST SF 10 MIN: CPT | Mod: PBBFAC | Performed by: STUDENT IN AN ORGANIZED HEALTH CARE EDUCATION/TRAINING PROGRAM

## 2022-01-13 PROCEDURE — 99024 PR POST-OP FOLLOW-UP VISIT: ICD-10-PCS | Mod: POP,,, | Performed by: STUDENT IN AN ORGANIZED HEALTH CARE EDUCATION/TRAINING PROGRAM

## 2022-01-13 PROCEDURE — 99999 PR PBB SHADOW E&M-EST. PATIENT-LVL II: ICD-10-PCS | Mod: PBBFAC,,, | Performed by: STUDENT IN AN ORGANIZED HEALTH CARE EDUCATION/TRAINING PROGRAM

## 2022-01-13 PROCEDURE — 99024 POSTOP FOLLOW-UP VISIT: CPT | Mod: POP,,, | Performed by: STUDENT IN AN ORGANIZED HEALTH CARE EDUCATION/TRAINING PROGRAM

## 2022-01-13 NOTE — PROGRESS NOTES
HPI     Post-op Evaluation      Additional comments: PCIOL OS POW#1 second eye              Comments     Pt here for 1 week PO PCIOL of LEFT eye. PT states VA stable in BOTH   eyes. Pt states not having any eye pain or discomfort. PT denies any other   ocular issues at this time. Pt compliant with gtts and last used them this   morning.     DNL REFERRAL      1. PCIOL OD 11/10/21  PCIOL OS 01/06/22  2. +HTN    PRED OS QID          Last edited by Mayuri Gonsalves on 1/13/2022 10:20 AM. (History)            Assessment /Plan     For exam results, see Encounter Report.    Post-operative state  Cataract extraction status of eye, left- Impression/Plan  POW#1 S/P CEIOL OS : Doing well with no evidence of infection. Persistent inflammation. Will continue steroids.    PF QID until next visit    Pt given and instructed in one week postop instructions. Can resume normal activitites and d/c eye shield. OTC reading glasses can be used until evaluated for final MR. Follow up in one month or PRN pain, redness, vision loss, or other concerns.        Return to clinic in 4 week PO

## 2022-01-18 ENCOUNTER — TELEPHONE (OUTPATIENT)
Dept: ADMINISTRATIVE | Facility: HOSPITAL | Age: 72
End: 2022-01-18
Payer: MEDICARE

## 2022-02-11 ENCOUNTER — OFFICE VISIT (OUTPATIENT)
Dept: OPHTHALMOLOGY | Facility: CLINIC | Age: 72
End: 2022-02-11
Payer: MEDICARE

## 2022-02-11 DIAGNOSIS — Z98.41 CATARACT EXTRACTION STATUS OF EYE, RIGHT: ICD-10-CM

## 2022-02-11 DIAGNOSIS — Z98.42 CATARACT EXTRACTION STATUS OF EYE, LEFT: ICD-10-CM

## 2022-02-11 DIAGNOSIS — I10 ESSENTIAL HYPERTENSION: ICD-10-CM

## 2022-02-11 DIAGNOSIS — Z98.890 POST-OPERATIVE STATE: Primary | ICD-10-CM

## 2022-02-11 PROCEDURE — 99024 POSTOP FOLLOW-UP VISIT: CPT | Mod: POP,,, | Performed by: STUDENT IN AN ORGANIZED HEALTH CARE EDUCATION/TRAINING PROGRAM

## 2022-02-11 PROCEDURE — 99999 PR PBB SHADOW E&M-EST. PATIENT-LVL II: ICD-10-PCS | Mod: PBBFAC,,, | Performed by: STUDENT IN AN ORGANIZED HEALTH CARE EDUCATION/TRAINING PROGRAM

## 2022-02-11 PROCEDURE — 99999 PR PBB SHADOW E&M-EST. PATIENT-LVL II: CPT | Mod: PBBFAC,,, | Performed by: STUDENT IN AN ORGANIZED HEALTH CARE EDUCATION/TRAINING PROGRAM

## 2022-02-11 PROCEDURE — 99212 OFFICE O/P EST SF 10 MIN: CPT | Mod: PBBFAC | Performed by: STUDENT IN AN ORGANIZED HEALTH CARE EDUCATION/TRAINING PROGRAM

## 2022-02-11 PROCEDURE — 99024 PR POST-OP FOLLOW-UP VISIT: ICD-10-PCS | Mod: POP,,, | Performed by: STUDENT IN AN ORGANIZED HEALTH CARE EDUCATION/TRAINING PROGRAM

## 2022-02-11 RX ORDER — PREDNISOLONE ACETATE 10 MG/ML
1 SUSPENSION/ DROPS OPHTHALMIC EVERY 4 HOURS
Qty: 15 ML | Refills: 1 | Status: SHIPPED | OUTPATIENT
Start: 2022-02-11 | End: 2022-11-17

## 2022-02-11 NOTE — PROGRESS NOTES
HPI     Pt in today for a 4 week post-op of the left eye. Pt states her vision is   doing well today. Pt denies any ocular pain or discomfort. Pt states she   ran out of drops on Wednesday, but was compliant before they ran out.      1. PCIOL OD 11/10/21  PCIOL OS 01/06/22  2. +HTN    PRED OS QID      Last edited by Lucille Reveles on 2/11/2022  9:16 AM. (History)            Assessment /Plan     For exam results, see Encounter Report.    Post-operative state    Cataract extraction status of eye, left  Cataract extraction status of eye, right  Persistent inflammation OU   Continue PF QID OU    Essential hypertension  Strict BP control  Follow up with PCP    Other orders  -     prednisoLONE acetate (PRED FORTE) 1 % DrpS; Place 1 drop into both eyes every 4 (four) hours.  Dispense: 15 mL; Refill: 1      RTC 4 weeks AC check OU

## 2022-03-11 ENCOUNTER — OFFICE VISIT (OUTPATIENT)
Dept: OPHTHALMOLOGY | Facility: CLINIC | Age: 72
End: 2022-03-11
Payer: MEDICARE

## 2022-03-11 DIAGNOSIS — Z98.41 CATARACT EXTRACTION STATUS OF EYE, RIGHT: ICD-10-CM

## 2022-03-11 DIAGNOSIS — Z98.42 CATARACT EXTRACTION STATUS OF EYE, LEFT: ICD-10-CM

## 2022-03-11 DIAGNOSIS — Z98.890 POST-OPERATIVE STATE: Primary | ICD-10-CM

## 2022-03-11 PROCEDURE — 99024 PR POST-OP FOLLOW-UP VISIT: ICD-10-PCS | Mod: POP,,, | Performed by: STUDENT IN AN ORGANIZED HEALTH CARE EDUCATION/TRAINING PROGRAM

## 2022-03-11 PROCEDURE — 99999 PR PBB SHADOW E&M-EST. PATIENT-LVL II: CPT | Mod: PBBFAC,,, | Performed by: STUDENT IN AN ORGANIZED HEALTH CARE EDUCATION/TRAINING PROGRAM

## 2022-03-11 PROCEDURE — 99999 PR PBB SHADOW E&M-EST. PATIENT-LVL II: ICD-10-PCS | Mod: PBBFAC,,, | Performed by: STUDENT IN AN ORGANIZED HEALTH CARE EDUCATION/TRAINING PROGRAM

## 2022-03-11 PROCEDURE — 99024 POSTOP FOLLOW-UP VISIT: CPT | Mod: POP,,, | Performed by: STUDENT IN AN ORGANIZED HEALTH CARE EDUCATION/TRAINING PROGRAM

## 2022-03-11 PROCEDURE — 99212 OFFICE O/P EST SF 10 MIN: CPT | Mod: PBBFAC | Performed by: STUDENT IN AN ORGANIZED HEALTH CARE EDUCATION/TRAINING PROGRAM

## 2022-03-11 NOTE — PROGRESS NOTES
HPI     Post-op Evaluation      Additional comments: 4 week post-op              Comments     Pt in today for a 4 week rtc post-op of both eyes. Pt states her vision   has improved since her last visit. Pt denies any ocular pain or   discomfort. Pt states she's compliant with her drops.     1. PCIOL OD 11/10/21  PCIOL OS 01/06/22  2. +HTN    OU- PRED QID            Last edited by Lcuille Reveles on 3/11/2022  7:53 AM. (History)            Assessment /Plan     For exam results, see Encounter Report.    Post-operative state  Cataract extraction status of eye, left  Cataract extraction status of eye, right- Impression/Plan  S/P CEIOL OU : Doing well with no evidence of infection. Healing well    PF Taper 4-3-2-1 then stop      RETURN TO CLINIC IN 6M DFE

## 2022-07-14 ENCOUNTER — TELEPHONE (OUTPATIENT)
Dept: FAMILY MEDICINE | Facility: CLINIC | Age: 72
End: 2022-07-14

## 2022-07-14 DIAGNOSIS — Z12.31 ENCOUNTER FOR SCREENING MAMMOGRAM FOR MALIGNANT NEOPLASM OF BREAST: Primary | ICD-10-CM

## 2022-08-08 ENCOUNTER — HOSPITAL ENCOUNTER (OUTPATIENT)
Dept: RADIOLOGY | Facility: HOSPITAL | Age: 72
Discharge: HOME | End: 2022-08-08
Attending: FAMILY MEDICINE
Payer: MEDICARE

## 2022-08-08 VITALS — HEIGHT: 62 IN | BODY MASS INDEX: 18.67 KG/M2 | WEIGHT: 101.44 LBS

## 2022-08-08 DIAGNOSIS — Z12.31 ENCOUNTER FOR SCREENING MAMMOGRAM FOR MALIGNANT NEOPLASM OF BREAST: ICD-10-CM

## 2022-08-08 PROCEDURE — 77063 BREAST TOMOSYNTHESIS BI: CPT | Mod: TC

## 2022-08-08 PROCEDURE — 77063 BREAST TOMOSYNTHESIS BI: CPT | Mod: 26,,, | Performed by: RADIOLOGY

## 2022-08-08 PROCEDURE — 77067 MAMMO DIGITAL SCREENING BILAT WITH TOMO: ICD-10-PCS | Mod: 26,,, | Performed by: RADIOLOGY

## 2022-08-08 PROCEDURE — 77063 MAMMO DIGITAL SCREENING BILAT WITH TOMO: ICD-10-PCS | Mod: 26,,, | Performed by: RADIOLOGY

## 2022-08-08 PROCEDURE — 77067 SCR MAMMO BI INCL CAD: CPT | Mod: 26,,, | Performed by: RADIOLOGY

## 2022-08-08 PROCEDURE — 77067 SCR MAMMO BI INCL CAD: CPT | Mod: TC

## 2022-08-18 ENCOUNTER — PATIENT OUTREACH (OUTPATIENT)
Dept: ADMINISTRATIVE | Facility: HOSPITAL | Age: 72
End: 2022-08-18
Payer: MEDICARE

## 2022-09-12 ENCOUNTER — OFFICE VISIT (OUTPATIENT)
Dept: OPHTHALMOLOGY | Facility: CLINIC | Age: 72
End: 2022-09-12
Payer: MEDICARE

## 2022-09-12 DIAGNOSIS — H52.4 MYOPIA WITH PRESBYOPIA, BILATERAL: ICD-10-CM

## 2022-09-12 DIAGNOSIS — Z98.890 POST-OPERATIVE STATE: Primary | ICD-10-CM

## 2022-09-12 DIAGNOSIS — H52.13 MYOPIA WITH PRESBYOPIA, BILATERAL: ICD-10-CM

## 2022-09-12 DIAGNOSIS — Z96.1 PSEUDOPHAKIA OF BOTH EYES: ICD-10-CM

## 2022-09-12 PROCEDURE — 92014 PR EYE EXAM, EST PATIENT,COMPREHESV: ICD-10-PCS | Mod: S$PBB,,, | Performed by: OPTOMETRIST

## 2022-09-12 PROCEDURE — 99999 PR PBB SHADOW E&M-EST. PATIENT-LVL II: CPT | Mod: PBBFAC,,, | Performed by: OPTOMETRIST

## 2022-09-12 PROCEDURE — 99212 OFFICE O/P EST SF 10 MIN: CPT | Mod: PBBFAC | Performed by: OPTOMETRIST

## 2022-09-12 PROCEDURE — 99999 PR PBB SHADOW E&M-EST. PATIENT-LVL II: ICD-10-PCS | Mod: PBBFAC,,, | Performed by: OPTOMETRIST

## 2022-09-12 PROCEDURE — 92014 COMPRE OPH EXAM EST PT 1/>: CPT | Mod: S$PBB,,, | Performed by: OPTOMETRIST

## 2022-09-12 NOTE — PROGRESS NOTES
HPI     Cataract            Comments: Patient reports for 6 month dilation, PCIOL OU w/ ABR. Denies   pain or irritation. Va stable from previous visit, using  OTC readers for   small print-- doing well.          Last edited by Nolan Lind on 9/12/2022  7:59 AM.            Assessment /Plan     For exam results, see Encounter Report.    Post-operative state  Pseudophakia of both eyes  Stable OU  Doing well  Monitor 12 months     Myopia with presbyopia, bilateral  No Spec Rx required at this time, OTC readers okay to use    RTC 1 yr for dilated eye exam or PRN if any problems.   Discussed above and answered questions.

## 2022-11-04 ENCOUNTER — TELEPHONE (OUTPATIENT)
Dept: FAMILY MEDICINE | Facility: CLINIC | Age: 72
End: 2022-11-04

## 2022-11-17 ENCOUNTER — LAB VISIT (OUTPATIENT)
Dept: LAB | Facility: HOSPITAL | Age: 72
End: 2022-11-17
Attending: FAMILY MEDICINE
Payer: MEDICARE

## 2022-11-17 ENCOUNTER — OFFICE VISIT (OUTPATIENT)
Dept: FAMILY MEDICINE | Facility: CLINIC | Age: 72
End: 2022-11-17
Payer: MEDICARE

## 2022-11-17 VITALS
OXYGEN SATURATION: 98 % | HEART RATE: 96 BPM | TEMPERATURE: 99 F | WEIGHT: 98.19 LBS | SYSTOLIC BLOOD PRESSURE: 120 MMHG | BODY MASS INDEX: 18.07 KG/M2 | DIASTOLIC BLOOD PRESSURE: 72 MMHG | HEIGHT: 62 IN

## 2022-11-17 DIAGNOSIS — E55.9 VITAMIN D DEFICIENCY DISEASE: ICD-10-CM

## 2022-11-17 DIAGNOSIS — I10 ESSENTIAL HYPERTENSION: Chronic | ICD-10-CM

## 2022-11-17 DIAGNOSIS — E78.5 HYPERLIPIDEMIA, UNSPECIFIED HYPERLIPIDEMIA TYPE: ICD-10-CM

## 2022-11-17 DIAGNOSIS — M85.80 OSTEOPENIA, UNSPECIFIED LOCATION: ICD-10-CM

## 2022-11-17 DIAGNOSIS — I10 ESSENTIAL HYPERTENSION: Primary | Chronic | ICD-10-CM

## 2022-11-17 PROCEDURE — 36415 COLL VENOUS BLD VENIPUNCTURE: CPT | Mod: PO | Performed by: FAMILY MEDICINE

## 2022-11-17 PROCEDURE — 99999 PR PBB SHADOW E&M-EST. PATIENT-LVL III: CPT | Mod: PBBFAC,,, | Performed by: FAMILY MEDICINE

## 2022-11-17 PROCEDURE — 99214 OFFICE O/P EST MOD 30 MIN: CPT | Mod: S$PBB,,, | Performed by: FAMILY MEDICINE

## 2022-11-17 PROCEDURE — 99999 PR PBB SHADOW E&M-EST. PATIENT-LVL III: ICD-10-PCS | Mod: PBBFAC,,, | Performed by: FAMILY MEDICINE

## 2022-11-17 PROCEDURE — 85025 COMPLETE CBC W/AUTO DIFF WBC: CPT | Performed by: FAMILY MEDICINE

## 2022-11-17 PROCEDURE — 80061 LIPID PANEL: CPT | Performed by: FAMILY MEDICINE

## 2022-11-17 PROCEDURE — G0008 ADMIN INFLUENZA VIRUS VAC: HCPCS | Mod: PBBFAC,PO

## 2022-11-17 PROCEDURE — 80053 COMPREHEN METABOLIC PANEL: CPT | Performed by: FAMILY MEDICINE

## 2022-11-17 PROCEDURE — 99214 PR OFFICE/OUTPT VISIT, EST, LEVL IV, 30-39 MIN: ICD-10-PCS | Mod: S$PBB,,, | Performed by: FAMILY MEDICINE

## 2022-11-17 PROCEDURE — 99213 OFFICE O/P EST LOW 20 MIN: CPT | Mod: PBBFAC,PO,25 | Performed by: FAMILY MEDICINE

## 2022-11-17 PROCEDURE — 84443 ASSAY THYROID STIM HORMONE: CPT | Performed by: FAMILY MEDICINE

## 2022-11-17 RX ORDER — POTASSIUM CHLORIDE 750 MG/1
10 TABLET, EXTENDED RELEASE ORAL DAILY
Qty: 90 TABLET | Refills: 3 | Status: SHIPPED | OUTPATIENT
Start: 2022-11-17 | End: 2023-11-17 | Stop reason: SDUPTHER

## 2022-11-17 RX ORDER — HYDROCHLOROTHIAZIDE 12.5 MG/1
12.5 CAPSULE ORAL DAILY
Qty: 90 CAPSULE | Refills: 3 | Status: SHIPPED | OUTPATIENT
Start: 2022-11-17 | End: 2023-11-17 | Stop reason: SDUPTHER

## 2022-11-17 RX ORDER — CYPROHEPTADINE HYDROCHLORIDE 4 MG/1
4 TABLET ORAL 3 TIMES DAILY PRN
Qty: 90 TABLET | Refills: 3 | Status: SHIPPED | OUTPATIENT
Start: 2022-11-17 | End: 2023-03-30

## 2022-11-17 NOTE — PROGRESS NOTES
CHIEF COMPLAINT:  This is a 72-year-old female here for follow up chronic medical conditions and for preventive health exam.     SUBJECTIVE: Patient complains of weight loss of 16 pounds in the last 2 years with loss of appetite. She requests help with her appetite. The patient has essential hypertension for which she takes HCTZ 12.5 mg daily. Her BP today is 120/72. She takes potassium supplement for hypokalemia. She finds that taking calcium supplement with vitamin D 3 tablets daily helps with her joint pain.  Patient had vitamin-D level in toxic range because of excessive vitamin-D replacement in 2019.      Eye exam October 2022.  Pap smear September 2014. Mammogram, August 2022.  Bone DEXA scan January 2022.  Colonoscopy November 2018 due again November 2028. Tdap August 2012. Zostavax August 2012.  Prevnar vaccine September 2015.  Pneumovax September 2016.  Influenza vaccine November 2020. COVID 19 vaccine January, February, October 2021, April, October 2022.     ROS:  GENERAL: Patient denies fever, chills, night sweats. Patient denies weight gain. Patient denies anorexia, fatigue, weakness or swollen glands.  Positive for weight loss.  SKIN: Patient denies rash or hair loss.  HEENT: Patient denies sore throat, ear pain, hearing loss, nasal congestion, or runny nose. Patient denies visual disturbance, eye irritation or discharge.  LUNGS: Patient denies cough, wheeze or hemoptysis.  CARDIOVASCULAR: Patient denies chest pain, shortness of breath, palpitations, syncope or lower extremity edema.  GI: Patient denies abdominal pain, nausea, vomiting, diarrhea, constipation, blood in stool or melena.  GENITOURINARY: Patient denies pelvic pain, vaginal discharge, itch or odor. Patient denies irregular vaginal bleeding. Patient denies dysuria, frequency, hematuria, nocturia, urgency or incontinence.  BREASTS: Patient denies breast pain, mass or nipple discharge.  MUSCULOSKELETAL: Patient denies joint swelling, redness  or warmth.  NEUROLOGIC: Patient denies headache, vertigo, paresthesias, weakness in limb, dysarthria, dysphagia or abnormality of gait.  PSYCHIATRIC: Patient denies anxiety, depression, or memory loss.     OBJECTIVE:   GENERAL: Well-developed well-nourished black female alert and oriented x3, in no acute distress. Weight gain of 3 lb in the last year.  SKIN: Clear without rash.  Normal color and tone.  Sebaceous cyst, posterior neck.  HEENT: Eyes: Clear conjunctivae. No scleral icterus. Pupils equal reactive to light and accommodation. Ears: Clear TMs. Clear canals. Nose: Without congestion. Pharynx: Without injection or exudates.    NECK: Supple, normal range of motion. No masses, lymphadenopathy or enlarged thyroid. No JVD. Carotids 2+ and equal. No bruits.    LUNGS: Clear to auscultation. Normal respiratory effort.    CARDIOVASCULAR: Regular rhythm, normal S1, S2 without murmur, gallop or rub.    BACK: No CVA or spinal tenderness.    BREASTS: No masses, tenderness or nipple discharge.    ABDOMEN: Normal appearance. Active bowel sounds. Soft, nontender without mass or organomegaly. No rebound or guarding.    EXTREMITIES: Without cyanosis, clubbing or edema. Distal pulses 2+ and equal. Normal range of motion in all extremities. No joint effusion, erythema or warmth. Bunions bilaterally, left greater than right.  Right knee enlargement greater than left.  NEUROLOGIC: Cranial nerves II through XII without deficit. Motor strength equal bilaterally. Sensation normal to touch. Deep tendon reflexes 2+ and equal. Gait without abnormality. No tremor. Negative cerebellar signs.    PELVIC: External: Without lesions or inflammation.  Urethral meatus normal size and shape.  Vaginal:  No discharge.  Atrophic changes. Cervix: Normal appearance.  No motion tenderness.  No Pap.  Uterus: Normal size and shape. Adnexa:  Nontender.  No palpable masses.  Rectovaginal: Confirms.  Normal anal sphincter tone.  Heme-negative stool x2.       ASSESSMENT:  1. Essential hypertension    2. Hyperlipidemia, unspecified hyperlipidemia type    3. Osteopenia, unspecified location    4. Vitamin D deficiency disease      PLAN:  1. Weight reduction. Exercise regularly.  2. Age-appropriate counseling.  3. Fasting lab.  4. Flu vaccine.  5.  Refill hydrochlorothiazide and potassium supplement.  6.  Periactin 4 mg 3 times daily as needed for decreased appetite.  7.  Follow-up annually.    This note is generated with speech recognition software and is subject to transcription error and sound alike phrases that may be missed by proofreading.

## 2022-11-18 LAB
ALBUMIN SERPL BCP-MCNC: 4.3 G/DL (ref 3.5–5.2)
ALP SERPL-CCNC: 46 U/L (ref 55–135)
ALT SERPL W/O P-5'-P-CCNC: 19 U/L (ref 10–44)
ANION GAP SERPL CALC-SCNC: 13 MMOL/L (ref 8–16)
AST SERPL-CCNC: 30 U/L (ref 10–40)
BASOPHILS # BLD AUTO: 0.06 K/UL (ref 0–0.2)
BASOPHILS NFR BLD: 1.2 % (ref 0–1.9)
BILIRUB SERPL-MCNC: 0.7 MG/DL (ref 0.1–1)
BUN SERPL-MCNC: 15 MG/DL (ref 8–23)
CALCIUM SERPL-MCNC: 10.4 MG/DL (ref 8.7–10.5)
CHLORIDE SERPL-SCNC: 105 MMOL/L (ref 95–110)
CHOLEST SERPL-MCNC: 207 MG/DL (ref 120–199)
CHOLEST/HDLC SERPL: 3 {RATIO} (ref 2–5)
CO2 SERPL-SCNC: 25 MMOL/L (ref 23–29)
CREAT SERPL-MCNC: 0.9 MG/DL (ref 0.5–1.4)
DIFFERENTIAL METHOD: ABNORMAL
EOSINOPHIL # BLD AUTO: 0.1 K/UL (ref 0–0.5)
EOSINOPHIL NFR BLD: 2.1 % (ref 0–8)
ERYTHROCYTE [DISTWIDTH] IN BLOOD BY AUTOMATED COUNT: 12.3 % (ref 11.5–14.5)
EST. GFR  (NO RACE VARIABLE): >60 ML/MIN/1.73 M^2
GLUCOSE SERPL-MCNC: 76 MG/DL (ref 70–110)
HCT VFR BLD AUTO: 42.3 % (ref 37–48.5)
HDLC SERPL-MCNC: 68 MG/DL (ref 40–75)
HDLC SERPL: 32.9 % (ref 20–50)
HGB BLD-MCNC: 12.9 G/DL (ref 12–16)
IMM GRANULOCYTES # BLD AUTO: 0.01 K/UL (ref 0–0.04)
IMM GRANULOCYTES NFR BLD AUTO: 0.2 % (ref 0–0.5)
LDLC SERPL CALC-MCNC: 125.4 MG/DL (ref 63–159)
LYMPHOCYTES # BLD AUTO: 2 K/UL (ref 1–4.8)
LYMPHOCYTES NFR BLD: 38.1 % (ref 18–48)
MCH RBC QN AUTO: 29.4 PG (ref 27–31)
MCHC RBC AUTO-ENTMCNC: 30.5 G/DL (ref 32–36)
MCV RBC AUTO: 96 FL (ref 82–98)
MONOCYTES # BLD AUTO: 0.6 K/UL (ref 0.3–1)
MONOCYTES NFR BLD: 11.8 % (ref 4–15)
NEUTROPHILS # BLD AUTO: 2.4 K/UL (ref 1.8–7.7)
NEUTROPHILS NFR BLD: 46.6 % (ref 38–73)
NONHDLC SERPL-MCNC: 139 MG/DL
NRBC BLD-RTO: 0 /100 WBC
PLATELET # BLD AUTO: 255 K/UL (ref 150–450)
PMV BLD AUTO: 10.5 FL (ref 9.2–12.9)
POTASSIUM SERPL-SCNC: 3.9 MMOL/L (ref 3.5–5.1)
PROT SERPL-MCNC: 8 G/DL (ref 6–8.4)
RBC # BLD AUTO: 4.39 M/UL (ref 4–5.4)
SODIUM SERPL-SCNC: 143 MMOL/L (ref 136–145)
TRIGL SERPL-MCNC: 68 MG/DL (ref 30–150)
TSH SERPL DL<=0.005 MIU/L-ACNC: 1.1 UIU/ML (ref 0.4–4)
WBC # BLD AUTO: 5.17 K/UL (ref 3.9–12.7)

## 2022-12-28 ENCOUNTER — TELEPHONE (OUTPATIENT)
Dept: ADMINISTRATIVE | Facility: HOSPITAL | Age: 72
End: 2022-12-28
Payer: MEDICARE

## 2023-07-27 ENCOUNTER — TELEPHONE (OUTPATIENT)
Dept: FAMILY MEDICINE | Facility: CLINIC | Age: 73
End: 2023-07-27
Payer: MEDICARE

## 2023-07-27 DIAGNOSIS — Z12.31 ENCOUNTER FOR SCREENING MAMMOGRAM FOR MALIGNANT NEOPLASM OF BREAST: Primary | ICD-10-CM

## 2023-07-28 NOTE — TELEPHONE ENCOUNTER
----- Message from Moira Mejias sent at 7/28/2023 10:14 AM CDT -----  States she is calling regarding an approval for her Mammogram. She wants to know, if it has been approved. Please call pt [phone]. Thank you

## 2023-08-01 ENCOUNTER — TELEPHONE (OUTPATIENT)
Dept: FAMILY MEDICINE | Facility: CLINIC | Age: 73
End: 2023-08-01
Payer: MEDICARE

## 2023-08-01 NOTE — TELEPHONE ENCOUNTER
Spoke with pt and informed pt that orders have been sent over to provider. Once orders have been placed we will call pt to get scheduled. Pt verbalized understanding.

## 2023-08-01 NOTE — TELEPHONE ENCOUNTER
----- Message from Maria Del Carmen Parks sent at 8/1/2023 11:23 AM CDT -----  Who Called: Pt    What is the request in detail: Requesting call back to discuss needing orders added for mammo, pt needs to be scheduled. Please advise    Can the clinic reply by [facility]? No    Best Call Back Number: [phone]      Additional Information:

## 2023-08-02 ENCOUNTER — TELEPHONE (OUTPATIENT)
Dept: FAMILY MEDICINE | Facility: CLINIC | Age: 73
End: 2023-08-02
Payer: MEDICARE

## 2023-08-07 ENCOUNTER — PES CALL (OUTPATIENT)
Dept: ADMINISTRATIVE | Facility: CLINIC | Age: 73
End: 2023-08-07
Payer: MEDICARE

## 2023-09-12 ENCOUNTER — OFFICE VISIT (OUTPATIENT)
Dept: OPHTHALMOLOGY | Facility: CLINIC | Age: 73
End: 2023-09-12
Payer: MEDICARE

## 2023-09-12 ENCOUNTER — PATIENT MESSAGE (OUTPATIENT)
Dept: OPHTHALMOLOGY | Facility: CLINIC | Age: 73
End: 2023-09-12

## 2023-09-12 ENCOUNTER — HOSPITAL ENCOUNTER (OUTPATIENT)
Dept: RADIOLOGY | Facility: HOSPITAL | Age: 73
Discharge: HOME | End: 2023-09-12
Attending: FAMILY MEDICINE
Payer: MEDICARE

## 2023-09-12 DIAGNOSIS — Z96.1 PSEUDOPHAKIA OF BOTH EYES: Primary | ICD-10-CM

## 2023-09-12 DIAGNOSIS — H52.4 PRESBYOPIA: ICD-10-CM

## 2023-09-12 DIAGNOSIS — Z12.31 ENCOUNTER FOR SCREENING MAMMOGRAM FOR MALIGNANT NEOPLASM OF BREAST: ICD-10-CM

## 2023-09-12 PROCEDURE — 77063 BREAST TOMOSYNTHESIS BI: CPT | Mod: 26,,, | Performed by: RADIOLOGY

## 2023-09-12 PROCEDURE — 99212 OFFICE O/P EST SF 10 MIN: CPT | Mod: PBBFAC | Performed by: OPTOMETRIST

## 2023-09-12 PROCEDURE — 92014 PR EYE EXAM, EST PATIENT,COMPREHESV: ICD-10-PCS | Mod: S$PBB,,, | Performed by: OPTOMETRIST

## 2023-09-12 PROCEDURE — 77067 SCR MAMMO BI INCL CAD: CPT | Mod: 26,,, | Performed by: RADIOLOGY

## 2023-09-12 PROCEDURE — 77067 SCR MAMMO BI INCL CAD: CPT | Mod: TC

## 2023-09-12 PROCEDURE — 99999 PR PBB SHADOW E&M-EST. PATIENT-LVL II: CPT | Mod: PBBFAC,,, | Performed by: OPTOMETRIST

## 2023-09-12 PROCEDURE — 92014 COMPRE OPH EXAM EST PT 1/>: CPT | Mod: S$PBB,,, | Performed by: OPTOMETRIST

## 2023-09-12 PROCEDURE — 77063 MAMMO DIGITAL SCREENING BILAT WITH TOMO: ICD-10-PCS | Mod: 26,,, | Performed by: RADIOLOGY

## 2023-09-12 PROCEDURE — 77067 MAMMO DIGITAL SCREENING BILAT WITH TOMO: ICD-10-PCS | Mod: 26,,, | Performed by: RADIOLOGY

## 2023-09-12 PROCEDURE — 99999 PR PBB SHADOW E&M-EST. PATIENT-LVL II: ICD-10-PCS | Mod: PBBFAC,,, | Performed by: OPTOMETRIST

## 2023-09-12 NOTE — PROGRESS NOTES
HPI     Annual Exam            Comments: RTC 1 yr for dilated eye exam  Vision changes since last eye exam?: no     Any eye pain today: no    Other ocular symptoms: no    Interested in contact lens fitting today? no                     Comments    Pt wears +2.75 readers          Last edited by Reina Candelario on 9/12/2023  8:31 AM.            Assessment /Plan     For exam results, see Encounter Report.    Pseudophakia of both eyes  Stable OU  Monitor 12 months    Presbyopia  Spec Rx is optional, ok to continue with OTC readers    Normal gOCT today with nice, symmetric GCL OU      RTC 1 yr for dilated eye exam and gOCT or PRN if any problems.   Discussed above and answered questions.

## 2023-09-29 ENCOUNTER — TELEPHONE (OUTPATIENT)
Dept: PRIMARY CARE CLINIC | Facility: CLINIC | Age: 73
End: 2023-09-29
Payer: MEDICARE

## 2023-09-29 NOTE — TELEPHONE ENCOUNTER
----- Message from Karina Henry sent at 9/29/2023  8:09 AM CDT -----  Contact: Josy  .Patient is calling to speak with the nurse regarding questions and concerns . Reports was told needing appt and would like to come in on 11/17 . Please give patient a call back at .[phone].

## 2023-09-29 NOTE — TELEPHONE ENCOUNTER
Spoke with patient and she stated that she would like to schedule a follow up visit with Dr. Puente on the same day as her upcoming appointment with Dr. Ortiz. Informed her that we couldn't place those two visits on the same day. She agreed to schedule a virtual visit on 11/15/23 instead because she lives in [place] and doesn't want to make the trip to [place] twice.

## 2023-10-25 ENCOUNTER — TELEPHONE (OUTPATIENT)
Dept: INTERNAL MEDICINE | Facility: CLINIC | Age: 73
End: 2023-10-25
Payer: MEDICARE

## 2023-10-25 NOTE — TELEPHONE ENCOUNTER
S/w pt, advised no AWV are done at this location, verbalized understanding.     ----- Message from Fredrick Jewell MA sent at 10/25/2023  9:26 AM CDT -----  Contact: Josy @ [phone]  The patient calling to see if there is anyway she can do her AWV at this location. Please give her a call back at [phone].

## 2023-11-17 ENCOUNTER — OFFICE VISIT (OUTPATIENT)
Dept: INTERNAL MEDICINE | Facility: CLINIC | Age: 73
End: 2023-11-17
Payer: MEDICARE

## 2023-11-17 ENCOUNTER — LAB VISIT (OUTPATIENT)
Dept: LAB | Facility: HOSPITAL | Age: 73
End: 2023-11-17
Attending: FAMILY MEDICINE
Payer: MEDICARE

## 2023-11-17 ENCOUNTER — E-CONSULT (OUTPATIENT)
Dept: DERMATOLOGY | Facility: CLINIC | Age: 73
End: 2023-11-17

## 2023-11-17 VITALS
SYSTOLIC BLOOD PRESSURE: 134 MMHG | OXYGEN SATURATION: 100 % | DIASTOLIC BLOOD PRESSURE: 80 MMHG | WEIGHT: 101.06 LBS | HEART RATE: 66 BPM | RESPIRATION RATE: 14 BRPM | BODY MASS INDEX: 18.49 KG/M2 | TEMPERATURE: 98 F

## 2023-11-17 DIAGNOSIS — I10 ESSENTIAL HYPERTENSION: Chronic | ICD-10-CM

## 2023-11-17 DIAGNOSIS — Z23 NEED FOR VACCINATION: ICD-10-CM

## 2023-11-17 DIAGNOSIS — I10 ESSENTIAL HYPERTENSION: Primary | Chronic | ICD-10-CM

## 2023-11-17 DIAGNOSIS — L81.8 PERIORBITAL HYPERPIGMENTATION: Primary | ICD-10-CM

## 2023-11-17 DIAGNOSIS — L81.9 HYPERPIGMENTATION OF SKIN: ICD-10-CM

## 2023-11-17 LAB
ALBUMIN SERPL BCP-MCNC: 4.3 G/DL (ref 3.5–5.2)
ALP SERPL-CCNC: 43 U/L (ref 55–135)
ALT SERPL W/O P-5'-P-CCNC: 8 U/L (ref 10–44)
ANION GAP SERPL CALC-SCNC: 12 MMOL/L (ref 8–16)
AST SERPL-CCNC: 26 U/L (ref 10–40)
BASOPHILS # BLD AUTO: 0.07 K/UL (ref 0–0.2)
BASOPHILS NFR BLD: 1.2 % (ref 0–1.9)
BILIRUB SERPL-MCNC: 0.6 MG/DL (ref 0.1–1)
BUN SERPL-MCNC: 11 MG/DL (ref 8–23)
CALCIUM SERPL-MCNC: 9.8 MG/DL (ref 8.7–10.5)
CHLORIDE SERPL-SCNC: 106 MMOL/L (ref 95–110)
CHOLEST SERPL-MCNC: 223 MG/DL (ref 120–199)
CHOLEST/HDLC SERPL: 3.7 {RATIO} (ref 2–5)
CO2 SERPL-SCNC: 24 MMOL/L (ref 23–29)
CREAT SERPL-MCNC: 1 MG/DL (ref 0.5–1.4)
DIFFERENTIAL METHOD: ABNORMAL
EOSINOPHIL # BLD AUTO: 0.1 K/UL (ref 0–0.5)
EOSINOPHIL NFR BLD: 2 % (ref 0–8)
ERYTHROCYTE [DISTWIDTH] IN BLOOD BY AUTOMATED COUNT: 13 % (ref 11.5–14.5)
EST. GFR  (NO RACE VARIABLE): 59.1 ML/MIN/1.73 M^2
GLUCOSE SERPL-MCNC: 89 MG/DL (ref 70–110)
HCT VFR BLD AUTO: 40 % (ref 37–48.5)
HDLC SERPL-MCNC: 61 MG/DL (ref 40–75)
HDLC SERPL: 27.4 % (ref 20–50)
HGB BLD-MCNC: 12.6 G/DL (ref 12–16)
IMM GRANULOCYTES # BLD AUTO: 0.01 K/UL (ref 0–0.04)
IMM GRANULOCYTES NFR BLD AUTO: 0.2 % (ref 0–0.5)
LDLC SERPL CALC-MCNC: 144.4 MG/DL (ref 63–159)
LYMPHOCYTES # BLD AUTO: 2.4 K/UL (ref 1–4.8)
LYMPHOCYTES NFR BLD: 40.1 % (ref 18–48)
MCH RBC QN AUTO: 28.9 PG (ref 27–31)
MCHC RBC AUTO-ENTMCNC: 31.5 G/DL (ref 32–36)
MCV RBC AUTO: 92 FL (ref 82–98)
MONOCYTES # BLD AUTO: 0.6 K/UL (ref 0.3–1)
MONOCYTES NFR BLD: 10.6 % (ref 4–15)
NEUTROPHILS # BLD AUTO: 2.8 K/UL (ref 1.8–7.7)
NEUTROPHILS NFR BLD: 45.9 % (ref 38–73)
NONHDLC SERPL-MCNC: 162 MG/DL
NRBC BLD-RTO: 0 /100 WBC
PLATELET # BLD AUTO: 294 K/UL (ref 150–450)
PMV BLD AUTO: 11.1 FL (ref 9.2–12.9)
POTASSIUM SERPL-SCNC: 3.7 MMOL/L (ref 3.5–5.1)
PROT SERPL-MCNC: 7.9 G/DL (ref 6–8.4)
RBC # BLD AUTO: 4.36 M/UL (ref 4–5.4)
SODIUM SERPL-SCNC: 142 MMOL/L (ref 136–145)
TRIGL SERPL-MCNC: 88 MG/DL (ref 30–150)
WBC # BLD AUTO: 6.04 K/UL (ref 3.9–12.7)

## 2023-11-17 PROCEDURE — 85025 COMPLETE CBC W/AUTO DIFF WBC: CPT | Performed by: FAMILY MEDICINE

## 2023-11-17 PROCEDURE — 99999 PR PBB SHADOW E&M-EST. PATIENT-LVL IV: ICD-10-PCS | Mod: PBBFAC,,, | Performed by: FAMILY MEDICINE

## 2023-11-17 PROCEDURE — 99451 PR INTERPROF, PHONE/INTERNET/EHR, CONSULT, >= 5MINS: ICD-10-PCS | Mod: ,,, | Performed by: STUDENT IN AN ORGANIZED HEALTH CARE EDUCATION/TRAINING PROGRAM

## 2023-11-17 PROCEDURE — 99999 PR PBB SHADOW E&M-EST. PATIENT-LVL IV: CPT | Mod: PBBFAC,,, | Performed by: FAMILY MEDICINE

## 2023-11-17 PROCEDURE — 99214 OFFICE O/P EST MOD 30 MIN: CPT | Mod: S$PBB,,, | Performed by: FAMILY MEDICINE

## 2023-11-17 PROCEDURE — 99214 PR OFFICE/OUTPT VISIT, EST, LEVL IV, 30-39 MIN: ICD-10-PCS | Mod: S$PBB,,, | Performed by: FAMILY MEDICINE

## 2023-11-17 PROCEDURE — 99451 NTRPROF PH1/NTRNET/EHR 5/>: CPT | Mod: ,,, | Performed by: STUDENT IN AN ORGANIZED HEALTH CARE EDUCATION/TRAINING PROGRAM

## 2023-11-17 PROCEDURE — 80053 COMPREHEN METABOLIC PANEL: CPT | Performed by: FAMILY MEDICINE

## 2023-11-17 PROCEDURE — 36415 COLL VENOUS BLD VENIPUNCTURE: CPT | Mod: PN | Performed by: FAMILY MEDICINE

## 2023-11-17 PROCEDURE — 80061 LIPID PANEL: CPT | Performed by: FAMILY MEDICINE

## 2023-11-17 PROCEDURE — 99214 OFFICE O/P EST MOD 30 MIN: CPT | Mod: PBBFAC,PN | Performed by: FAMILY MEDICINE

## 2023-11-17 RX ORDER — POTASSIUM CHLORIDE 750 MG/1
10 TABLET, EXTENDED RELEASE ORAL DAILY
Qty: 90 TABLET | Refills: 3 | Status: SHIPPED | OUTPATIENT
Start: 2023-11-17

## 2023-11-17 RX ORDER — HYDROCHLOROTHIAZIDE 12.5 MG/1
12.5 CAPSULE ORAL DAILY
Qty: 90 CAPSULE | Refills: 3 | Status: SHIPPED | OUTPATIENT
Start: 2023-11-17

## 2023-11-17 NOTE — PROGRESS NOTES
Josy Rehman  2023  [id number]    Jeimy Arreola MD  Patient Care Team:  Jeimy Arreola MD as PCP - General (Family Medicine)  Tony Parks LPN as Care Coordinator  Malik Zendejas OD as Consulting Physician (Optometry)    Has the patient seen any provider outside of the network since the last visit ? (no). If yes, HIPPA forms completed and records requested.      Visit Type: Annual physical exam     Chief Complaint:  Chief Complaint   Patient presents with    Annual Exam       History of Present Illness:  Josy Rehman is a 73 y.o. female who presents with complaints of darkened undereye circles. She has noticed them for some time but have worsened since her  passed away.  She reports adherence with medications and request refill of HCTZ and potassium chloride today. Has decreased appetite and weight loss since her  , denies feelings of depression.           Review of Systems   Constitutional:  Negative for chills and fever.   HENT:  Negative for congestion, ear discharge and sore throat.    Respiratory:  Negative for cough, shortness of breath and wheezing.    Cardiovascular:  Negative for chest pain and palpitations.   Gastrointestinal:  Negative for melena, nausea and vomiting.         Screening Questionnaires:    In the last two weeks how often have you felt down, depressed, or hopeless ( no )    In the last two weeks how often have you had little interest or pleasure in doing  (no )    In the last two weeks how often have you been bothered by the following problems:  1. Feeling nervous, anxious, or on edge ( no )    2. Not being able to stop or control worrying ( no)    3. Worrying too much about different things ( no)    4. Trouble relaxing ( no )    5. Being so restless that it is hard to sit still  (no )    6. Becoming easily annoyed or irritable (no)    7. Feeling afraid as if something awful might happen (no )    How often do you have a drink  containing Alcohol? denied     Do you exercise  (no ) not active    Do you take a baby Aspirin daily ( no)    Do you have an advance directive ( yes ) The patient was given information regarding Living Will/Durable Power-of- if requested.     The following were reviewed: Active problem list, medication list, allergies, family history, social history, and Health Maintenance.     History:  Past Medical History:   Diagnosis Date    Anemia     Diverticular stricture 12/2018    Essential hypertension     Headache(784.0)     Heart murmur     since childhood    Hyperlipidemia     Osteoarthritis of right knee     Osteopenia     Pigmented skin lesion     PONV (postoperative nausea and vomiting)     Vertigo      Past Surgical History:   Procedure Laterality Date    CATARACT EXTRACTION W/  INTRAOCULAR LENS IMPLANT Right 11/10/2021    CATARACT EXTRACTION W/  INTRAOCULAR LENS IMPLANT Left 01/06/2022    COLONOSCOPY N/A 11/21/2018    Procedure: COLONOSCOPY;  Surgeon: Ethan Mock MD;  Location: [facility] ENDO;  Service: Endoscopy;  Laterality: N/A;    ENDOMETRIAL ABLATION      HYSTERECTOMY      ROBOT-ASSISTED LAPAROSCOPIC RESECTION OF SIGMOID COLON USING DA AMADA XI N/A 12/27/2018    Procedure: XI ROBOTIC COLECTOMY, SIGMOID;  Surgeon: Ethan Mock MD;  Location: [facility] OR;  Service: General;  Laterality: N/A;     Family History   Problem Relation Age of Onset    Diabetes Father     Hypertension Father     Breast cancer Mother     Cancer Mother     Breast cancer Daughter     Prostate cancer Brother     Diabetes Brother     Stomach cancer Brother     Lung cancer Brother     Fibrocystic breast disease Daughter     Breast cancer Sister     Ovarian cancer Sister      Social History     Socioeconomic History    Marital status:     Number of children: 2   Tobacco Use    Smoking status: Never    Smokeless tobacco: Never   Substance and Sexual Activity    Alcohol use: No    Drug use: No    Sexual  activity: Yes   Social History Narrative    The patient is , has two adult children and 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              Social Determinants of Health     Financial Resource Strain: Low Risk  (11/2/2020)    Overall Financial Resource Strain (CARDIA)     Difficulty of Paying Living Expenses: Not hard at all   Food Insecurity: No Food Insecurity (11/2/2020)    Hunger Vital Sign     Worried About Running Out of Food in the Last Year: Never true     Ran Out of Food in the Last Year: Never true   Transportation Needs: No Transportation Needs (11/2/2020)    PRAPARE - Transportation     Lack of Transportation (Medical): No     Lack of Transportation (Non-Medical): No   Physical Activity: Inactive (11/2/2020)    Exercise Vital Sign     Days of Exercise per Week: 0 days     Minutes of Exercise per Session: 0 min   Stress: No Stress Concern Present (11/2/2020)    [place] [employer] - Occupational Stress Questionnaire     Feeling of Stress : Not at all     Patient Active Problem List   Diagnosis    Hyperlipidemia, unspecified    Vertigo    Osteoarthritis of both knees    Osteopenia    Essential hypertension    Vitamin D deficiency disease     Review of patient's allergies indicates:   Allergen Reactions    Penicillins      Other reaction(s): Unknown    Promethazine Itching       Health Maintenance  Health Maintenance Topics with due status: Not Due       Topic Last Completion Date    Colorectal Cancer Screening 11/21/2018    DEXA Scan 01/12/2022    Mammogram 09/12/2023    Lipid Panel 11/17/2023     Health Maintenance Due   Topic Date Due    RSV Vaccine (Age 60+ and Pregnant patients) (1 - 1-dose 60+ series) Never done    Shingles Vaccine (2 of 3) 10/08/2012    TETANUS VACCINE  08/13/2022    Influenza Vaccine (1) 09/01/2023    COVID-19 Vaccine (6 - 2023-24 season) 09/01/2023       Medications:  Current Outpatient Medications on File Prior to Visit   Medication  Sig Dispense Refill    calcium carbonate (OS-KHOI) 600 mg calcium (1,500 mg) Tab Take 600 mg by mouth once daily.       cyproheptadine (PERIACTIN) 4 mg tablet TAKE 1 TABLET (4 MG TOTAL) BY MOUTH 3 (THREE) TIMES DAILY AS NEEDED (FOR WEIGHT GAIN). 270 tablet 2    iron 18 mg Tab Take by mouth.      vitamin D (VITAMIN D3) 1000 units Tab Take 1,000 Units by mouth 2 (two) times daily.       No current facility-administered medications on file prior to visit.       Medications have been reviewed and reconciled with patient at visit today.    Barriers to medications present (no )    Adverse reactions to current medications (no)    Over the counter medications reviewed (Yes) and if needed added to active Medication list.    Exam:  Vitals:    11/17/23 0855   BP: 134/80   Pulse: 66   Resp: 14   Temp: 97.7 °F (36.5 °C)     Weight: 45.8 kg (101 lb 1.3 oz)   Body mass index is 18.49 kg/m².      Physical Exam  Constitutional:       Appearance: Normal appearance.   HENT:      Head: Normocephalic and atraumatic.      Right Ear: Ear canal and external ear normal. There is no impacted cerumen.      Left Ear: Tympanic membrane, ear canal and external ear normal. There is no impacted cerumen.      Nose: No congestion or rhinorrhea.      Mouth/Throat:      Pharynx: No oropharyngeal exudate or posterior oropharyngeal erythema.   Eyes:      General: No scleral icterus.        Right eye: No discharge.         Left eye: No discharge.      Extraocular Movements: Extraocular movements intact.      Conjunctiva/sclera: Conjunctivae normal.      Pupils: Pupils are equal, round, and reactive to light.   Cardiovascular:      Rate and Rhythm: Normal rate and regular rhythm.      Pulses: Normal pulses.      Heart sounds: No murmur heard.     No friction rub. No gallop.   Pulmonary:      Effort: Pulmonary effort is normal. No respiratory distress.      Breath sounds: Normal breath sounds.   Neurological:      General: No focal deficit present.       Mental Status: She is alert.   Psychiatric:         Mood and Affect: Mood normal.         Behavior: Behavior normal.       Laboratory Reviewed: (Yes)  Lab Results   Component Value Date    WBC 6.04 11/17/2023    HGB 12.6 11/17/2023    HCT 40.0 11/17/2023     11/17/2023    CHOL 223 (H) 11/17/2023    TRIG 88 11/17/2023    HDL 61 11/17/2023    ALT 8 (L) 11/17/2023    AST 26 11/17/2023     11/17/2023    K 3.7 11/17/2023     11/17/2023    CREATININE 1.0 11/17/2023    BUN 11 11/17/2023    CO2 24 11/17/2023    TSH 1.103 11/17/2022       Assessment:  The primary encounter diagnosis was Essential hypertension. Diagnoses of Need for vaccination and Hyperpigmentation of skin were also pertinent to this visit.    Plan:  Essential hypertension  -     LIPID PANEL; Future; Expected date: 11/17/2023  -     CBC W/ AUTO DIFFERENTIAL; Future; Expected date: 11/17/2023  -     COMPREHENSIVE METABOLIC PANEL; Future; Expected date: 11/17/2023  -     potassium chloride SA (K-DUR,KLOR-CON M) 10 MEQ tablet; Take 1 tablet (10 mEq total) by mouth once daily.  Dispense: 90 tablet; Refill: 3  -     hydroCHLOROthiazide (MICROZIDE) 12.5 mg capsule; Take 1 capsule (12.5 mg total) by mouth once daily.  Dispense: 90 capsule; Refill: 3    Need for vaccination  -     Influenza (FLUAD) - Quadrivalent (Adjuvanted) *Preferred* (65+) (PF); Future; Expected date: 11/17/2023  -     COVID-19 VAC, RHEA 2023 (PFIZER)(PF) 3 MCG/0.3 ML IM SUSR (6 MO-4 YRS); Future; Expected date: 11/17/2023    Hyperpigmentation of skin  -     E-Consult to Dermatology      -Patient's lab results were reviewed and discussed with patient  -Treatment options and alternatives were discussed with the patient. Patient expressed understanding. Patient was given the opportunity to ask questions and be an active participant in their medical care. Patient had no further questions or concerns at this time.   -Documentation of patient's health and condition was obtained  from family member who was present during visit.  -Patient is an overall moderate risk for health complications from their medical conditions.     Follow up: Follow up in about 9 months (around 8/17/2024) for Follow up blood pressure, weight.      Care Plan/Goals: Reviewed Yes   Goals    None         Jeimy Arreola MD  Family Medicine        After visit summary printed and given to patient upon discharge.  Patient goals and care plan are included in After visit summary.

## 2023-11-17 NOTE — CONSULTS
[facility]  Response for E-Consult     Patient Name: Josy Rehman  MRN: [id number]  Primary Care Provider: Jeimy Arreola MD   Requesting Provider: Jeimy Arreola MD  E-Consult to Dermatology  Consult performed by: Jeannie Lange MD  Consult ordered by: Jeimy Arerola MD  Reason for consult: Periorbital hyperpigmentation  Assessment/Recommendations: Thank you for this consult. Periorbital hyperpigmentation is often multifactorial. If she has allergies and scratches or rubs her eyes a lot, then antihistamines and topical steroids (hydrocortisone) can help. Good sleep may help with venous congestion. Hydroquinone 4% bid cream may also help for no longer than 6 months at a time.           Recommendation: as above     Contingency: none     Total time of Consultation: 10 minute    I did not speak to the requesting provider verbally about this.     *This eConsult is based on the clinical data available to me and is furnished without benefit of a physical examination. The eConsult will need to be interpreted in light of any clinical issues or changes in patient status not available to me at the time of filing this eConsults. Significant changes in patient condition or level of acuity should result in immediate formal consultation and reevaluation. Please alert me if you have further questions.    Thank you for this eConsult referral.     Jeannie Lange MD  [facility]

## 2023-12-06 PROBLEM — I70.0 AORTIC ATHEROSCLEROSIS: Status: ACTIVE | Noted: 2023-12-06

## 2023-12-06 PROBLEM — N18.31 STAGE 3A CHRONIC KIDNEY DISEASE: Status: ACTIVE | Noted: 2023-12-06

## 2023-12-12 ENCOUNTER — OFFICE VISIT (OUTPATIENT)
Dept: INTERNAL MEDICINE | Facility: CLINIC | Age: 73
End: 2023-12-12
Payer: MEDICARE

## 2023-12-12 VITALS
WEIGHT: 102.31 LBS | HEART RATE: 64 BPM | RESPIRATION RATE: 20 BRPM | BODY MASS INDEX: 18.83 KG/M2 | SYSTOLIC BLOOD PRESSURE: 128 MMHG | HEIGHT: 62 IN | DIASTOLIC BLOOD PRESSURE: 74 MMHG

## 2023-12-12 DIAGNOSIS — M85.80 OSTEOPENIA, UNSPECIFIED LOCATION: ICD-10-CM

## 2023-12-12 DIAGNOSIS — I70.0 AORTIC ATHEROSCLEROSIS: ICD-10-CM

## 2023-12-12 DIAGNOSIS — I10 ESSENTIAL HYPERTENSION: Chronic | ICD-10-CM

## 2023-12-12 DIAGNOSIS — M17.0 OSTEOARTHRITIS OF BOTH KNEES, UNSPECIFIED OSTEOARTHRITIS TYPE: ICD-10-CM

## 2023-12-12 DIAGNOSIS — Z00.00 ENCOUNTER FOR PREVENTATIVE ADULT HEALTH CARE EXAMINATION: Primary | ICD-10-CM

## 2023-12-12 DIAGNOSIS — E55.9 VITAMIN D DEFICIENCY DISEASE: ICD-10-CM

## 2023-12-12 DIAGNOSIS — E78.5 HYPERLIPIDEMIA, UNSPECIFIED HYPERLIPIDEMIA TYPE: ICD-10-CM

## 2023-12-12 PROCEDURE — 99999 PR PBB SHADOW E&M-EST. PATIENT-LVL IV: CPT | Mod: PBBFAC,,, | Performed by: NURSE PRACTITIONER

## 2023-12-12 PROCEDURE — G0439 PR MEDICARE ANNUAL WELLNESS SUBSEQUENT VISIT: ICD-10-PCS | Mod: ,,, | Performed by: NURSE PRACTITIONER

## 2023-12-12 PROCEDURE — G0439 PPPS, SUBSEQ VISIT: HCPCS | Mod: ,,, | Performed by: NURSE PRACTITIONER

## 2023-12-12 PROCEDURE — 99999 PR PBB SHADOW E&M-EST. PATIENT-LVL IV: ICD-10-PCS | Mod: PBBFAC,,, | Performed by: NURSE PRACTITIONER

## 2023-12-12 PROCEDURE — 99214 OFFICE O/P EST MOD 30 MIN: CPT | Mod: PBBFAC | Performed by: NURSE PRACTITIONER

## 2023-12-12 NOTE — PATIENT INSTRUCTIONS
Counseling and Referral of Other Preventative  (Italic type indicates deductible and co-insurance are waived)    Patient Name: Josy Rehman  Today's Date: 12/12/2023    Health Maintenance       Date Due Completion Date    High Dose Statin Never done ---    RSV Vaccine (Age 60+ and Pregnant patients) (1 - 1-dose 60+ series) Never done ---    Shingles Vaccine (2 of 3) 10/08/2012 8/13/2012    TETANUS VACCINE 08/13/2022 8/13/2012    Influenza Vaccine (1) 09/01/2023 11/17/2022    COVID-19 Vaccine (6 - 2023-24 season) 09/01/2023 10/12/2022    Mammogram 09/12/2024 9/12/2023    Lipid Panel 11/17/2024 11/17/2023    DEXA Scan 01/12/2027 1/12/2022    Override on 8/24/2012: Done (REPEAT 3 YRS)    Colorectal Cancer Screening 11/21/2028 11/21/2018        Orders Placed This Encounter   Procedures    Ambulatory referral/consult to Outpatient Case Management     The following information is provided to all patients.  This information is to help you find resources for any of the problems found today that may be affecting your health:                Living healthy guide: www.[facility].[place].gov      Understanding Diabetes: www.diabetes.org      Eating healthy: www.cdc.gov/healthyweight      CDC home safety checklist: www.cdc.gov/steadi/patient.html      Agency on Aging: www.goea.[place].[facility]      Alcoholics anonymous (AA): www.aa.org      Physical Activity: www.cindy.nih.gov/xp4cfzc      Tobacco use: www.quitwithusla.org

## 2023-12-12 NOTE — PROGRESS NOTES
"  Josy Rehman presented for a  Medicare AWV and comprehensive Health Risk Assessment today. The following components were reviewed and updated:    Medical history  Family History  Social history  Allergies and Current Medications  Health Risk Assessment  Health Maintenance  Care Team         ** See Completed Assessments for Annual Wellness Visit within the encounter summary.**         The following assessments were completed:  Living Situation  CAGE  Depression Screening  Timed Get Up and Go  Whisper Test  Cognitive Function Screening  Nutrition Screening  ADL Screening  PAQ Screening        Vitals:    12/12/23 0745   BP: 128/74   BP Location: Right arm   Patient Position: Sitting   Pulse: 64   Resp: 20   Weight: 46.4 kg (102 lb 4.7 oz)   Height:    Pain scale 5' 2" (1.575 m)    0     Body mass index is 18.71 kg/m².  Physical Exam  Constitutional:       General: She is not in acute distress.     Appearance: She is not ill-appearing or diaphoretic.   HENT:      Mouth/Throat:      Mouth: Mucous membranes are moist.   Eyes:      General:         Right eye: No discharge.         Left eye: No discharge.      Conjunctiva/sclera: Conjunctivae normal.   Cardiovascular:      Rate and Rhythm: Normal rate and regular rhythm.      Heart sounds: Murmur heard.      Comments: Very mild systolic murmur noted  Pulmonary:      Effort: Pulmonary effort is normal. No respiratory distress.      Breath sounds: Normal breath sounds.   Skin:     General: Skin is warm and dry.   Neurological:      Mental Status: She is alert and oriented to person, place, and time. Mental status is at baseline.   Psychiatric:         Mood and Affect: Mood normal.         Behavior: Behavior normal.         Thought Content: Thought content normal.         Judgment: Judgment normal.     Diagnoses and health risks identified today and associated recommendations/orders:    1. Encounter for preventative adult health care examination  Review for opioid " screening: Patient does not have Rx for Opioids  Review for substance use disorder: Patient does not use substance per chart    Patient states she does not drink alcohol     I offered to discuss advanced care planning, including how to pick a person who would make decisions for you if you were unable to make them for yourself, called a health care power of , and what kind of decisions you might make such as use of life sustaining treatments such as ventilators and tube feeding when faced with a life limiting illness recorded on a living will that they will need to know. (How you want to be cared for as you near the end of your natural life)     X Patient is interested in learning more about how to make advanced directives.  I provided them paperwork and offered to discuss this with them.    - Ambulatory referral/consult to Outpatient Case Management- Patient interested in Meals on Wheels program    2. Aortic atherosclerosis   11/28/2018 CT scan- There is a mild amount of atherosclerosis   Monitor  Follow up with PCP  Blood pressure control    3. Osteoarthritis of both knees, unspecified osteoarthritis type   Monitor  Stable  Follow up as needed, directed     4. Essential hypertension  Monitor  Stable  Continue home HCTZ, potassium    5. Hyperlipidemia, unspecified hyperlipidemia type  Monitor  Follow up with PCP  Continue home Low fat, low cholesterol diet    6. Vitamin D deficiency disease, Osteopenia, unspecified location  Monitor  Follow up with PCP  Continue home calcium, Vitamin D3                        Provided Josy with a 5-10 year written screening schedule and personal prevention plan. Recommendations were developed using the USPSTF age appropriate recommendations. Education, counseling, and referrals were provided as needed. After Visit Summary printed and given to patient which includes a list of additional screenings\tests needed.    Follow up in about 1 year (around 12/12/2024) for Annual  wellness visit.    CHUCKY SandersonP

## 2024-01-08 ENCOUNTER — OUTPATIENT CASE MANAGEMENT (OUTPATIENT)
Dept: ADMINISTRATIVE | Facility: OTHER | Age: 74
End: 2024-01-08
Payer: MEDICARE

## 2024-01-08 NOTE — PROGRESS NOTES
Attempt #:  1  This [facility] attempted to reach patient/caregiver to provide resource and left a message requesting a return call.   Outpatient Care Management   - Low Risk Patient Assessment    Patient: Josy Rehman  MRN:  [id number]  Date of Service:  1/8/2024  Completed by:  Gayle Brown LMSW  Referral Date: 12/12/2023    Reason for Visit  Patient presents with   OPCM SW First Assessment Attempt    1/8/2024  1st attempt to complete Initial Assessment  for Outpatient Care Management, left message.     Social Work Assessment - Low/Mod Risk   Plan Of Care   Case Closure      Brief Summary:  SW received a return call on voice recorder from patient. SW returned patient call.  received a referral from GELA Stevenson. SW completed assessment with patient. Patient resides alone. Patient reports family and or friends check on her daily. Patient  can complete ADL's and IADL;s  without assistance. Patient denied needing assistance with food, shelter, medication or medical. Patient interested in MOW. Patient reports complete ACP on day of AWV. Patient denied any current symptoms. Patient drives herself to and from appointments  . Care plan was created in collaboration with patient/caregiver input.

## 2024-06-17 ENCOUNTER — TELEPHONE (OUTPATIENT)
Dept: INTERNAL MEDICINE | Facility: CLINIC | Age: 74
End: 2024-06-17
Payer: MEDICARE

## 2024-06-17 DIAGNOSIS — Z12.31 ENCOUNTER FOR SCREENING MAMMOGRAM FOR BREAST CANCER: Primary | ICD-10-CM

## 2024-06-17 NOTE — TELEPHONE ENCOUNTER
----- Message from Roberta Ledbetter sent at 6/17/2024  8:09 AM CDT -----  Contact: Josy  Josy is needing a call back in regards to having her mammo orders put in. Please give her a call back at [phone]

## 2024-08-16 ENCOUNTER — OFFICE VISIT (OUTPATIENT)
Dept: INTERNAL MEDICINE | Facility: CLINIC | Age: 74
End: 2024-08-16
Payer: MEDICARE

## 2024-08-16 ENCOUNTER — LAB VISIT (OUTPATIENT)
Dept: LAB | Facility: HOSPITAL | Age: 74
End: 2024-08-16
Attending: FAMILY MEDICINE
Payer: MEDICARE

## 2024-08-16 VITALS
SYSTOLIC BLOOD PRESSURE: 120 MMHG | HEART RATE: 73 BPM | DIASTOLIC BLOOD PRESSURE: 70 MMHG | BODY MASS INDEX: 17 KG/M2 | TEMPERATURE: 98 F | WEIGHT: 92.94 LBS | OXYGEN SATURATION: 99 %

## 2024-08-16 DIAGNOSIS — R63.4 WEIGHT LOSS: ICD-10-CM

## 2024-08-16 DIAGNOSIS — E61.1 IRON DEFICIENCY: ICD-10-CM

## 2024-08-16 DIAGNOSIS — E43 SEVERE PROTEIN-CALORIE MALNUTRITION: ICD-10-CM

## 2024-08-16 DIAGNOSIS — F43.21 GRIEF: ICD-10-CM

## 2024-08-16 LAB
ALBUMIN SERPL BCP-MCNC: 4.3 G/DL (ref 3.5–5.2)
ALP SERPL-CCNC: 40 U/L (ref 55–135)
ALT SERPL W/O P-5'-P-CCNC: 8 U/L (ref 10–44)
ANION GAP SERPL CALC-SCNC: 10 MMOL/L (ref 8–16)
AST SERPL-CCNC: 24 U/L (ref 10–40)
BASOPHILS # BLD AUTO: 0.05 K/UL (ref 0–0.2)
BASOPHILS NFR BLD: 1.3 % (ref 0–1.9)
BILIRUB SERPL-MCNC: 0.6 MG/DL (ref 0.1–1)
BUN SERPL-MCNC: 13 MG/DL (ref 8–23)
CALCIUM SERPL-MCNC: 10.5 MG/DL (ref 8.7–10.5)
CHLORIDE SERPL-SCNC: 105 MMOL/L (ref 95–110)
CO2 SERPL-SCNC: 25 MMOL/L (ref 23–29)
CREAT SERPL-MCNC: 1 MG/DL (ref 0.5–1.4)
DIFFERENTIAL METHOD BLD: ABNORMAL
EOSINOPHIL # BLD AUTO: 0.1 K/UL (ref 0–0.5)
EOSINOPHIL NFR BLD: 2.3 % (ref 0–8)
ERYTHROCYTE [DISTWIDTH] IN BLOOD BY AUTOMATED COUNT: 12.3 % (ref 11.5–14.5)
EST. GFR  (NO RACE VARIABLE): 59.1 ML/MIN/1.73 M^2
FERRITIN SERPL-MCNC: 851 NG/ML (ref 20–300)
GLUCOSE SERPL-MCNC: 88 MG/DL (ref 70–110)
HCT VFR BLD AUTO: 39.3 % (ref 37–48.5)
HGB BLD-MCNC: 12.1 G/DL (ref 12–16)
IMM GRANULOCYTES # BLD AUTO: 0 K/UL (ref 0–0.04)
IMM GRANULOCYTES NFR BLD AUTO: 0 % (ref 0–0.5)
IRON SERPL-MCNC: 76 UG/DL (ref 30–160)
LYMPHOCYTES # BLD AUTO: 1.5 K/UL (ref 1–4.8)
LYMPHOCYTES NFR BLD: 39.6 % (ref 18–48)
MCH RBC QN AUTO: 28.9 PG (ref 27–31)
MCHC RBC AUTO-ENTMCNC: 30.8 G/DL (ref 32–36)
MCV RBC AUTO: 94 FL (ref 82–98)
MONOCYTES # BLD AUTO: 0.4 K/UL (ref 0.3–1)
MONOCYTES NFR BLD: 10.7 % (ref 4–15)
NEUTROPHILS # BLD AUTO: 1.8 K/UL (ref 1.8–7.7)
NEUTROPHILS NFR BLD: 46.1 % (ref 38–73)
NRBC BLD-RTO: 0 /100 WBC
PLATELET # BLD AUTO: 251 K/UL (ref 150–450)
PMV BLD AUTO: 10.9 FL (ref 9.2–12.9)
POTASSIUM SERPL-SCNC: 3.9 MMOL/L (ref 3.5–5.1)
PROT SERPL-MCNC: 7.7 G/DL (ref 6–8.4)
RBC # BLD AUTO: 4.18 M/UL (ref 4–5.4)
SATURATED IRON: 24 % (ref 20–50)
SODIUM SERPL-SCNC: 140 MMOL/L (ref 136–145)
T3FREE SERPL-MCNC: 2.4 PG/ML (ref 2.3–4.2)
T4 FREE SERPL-MCNC: 1.11 NG/DL (ref 0.71–1.51)
TOTAL IRON BINDING CAPACITY: 315 UG/DL (ref 250–450)
TRANSFERRIN SERPL-MCNC: 213 MG/DL (ref 200–375)
TSH SERPL DL<=0.005 MIU/L-ACNC: 0.96 UIU/ML (ref 0.4–4)
WBC # BLD AUTO: 3.84 K/UL (ref 3.9–12.7)

## 2024-08-16 PROCEDURE — 84481 FREE ASSAY (FT-3): CPT | Performed by: FAMILY MEDICINE

## 2024-08-16 PROCEDURE — 82607 VITAMIN B-12: CPT | Performed by: FAMILY MEDICINE

## 2024-08-16 PROCEDURE — 99213 OFFICE O/P EST LOW 20 MIN: CPT | Mod: PBBFAC,PN | Performed by: FAMILY MEDICINE

## 2024-08-16 PROCEDURE — 99214 OFFICE O/P EST MOD 30 MIN: CPT | Mod: S$PBB,,, | Performed by: FAMILY MEDICINE

## 2024-08-16 PROCEDURE — 36415 COLL VENOUS BLD VENIPUNCTURE: CPT | Mod: PN | Performed by: FAMILY MEDICINE

## 2024-08-16 PROCEDURE — 84443 ASSAY THYROID STIM HORMONE: CPT | Performed by: FAMILY MEDICINE

## 2024-08-16 PROCEDURE — 85025 COMPLETE CBC W/AUTO DIFF WBC: CPT | Performed by: FAMILY MEDICINE

## 2024-08-16 PROCEDURE — 80053 COMPREHEN METABOLIC PANEL: CPT | Performed by: FAMILY MEDICINE

## 2024-08-16 PROCEDURE — 99999 PR PBB SHADOW E&M-EST. PATIENT-LVL III: CPT | Mod: PBBFAC,,, | Performed by: FAMILY MEDICINE

## 2024-08-16 PROCEDURE — 83540 ASSAY OF IRON: CPT | Performed by: FAMILY MEDICINE

## 2024-08-16 PROCEDURE — 82746 ASSAY OF FOLIC ACID SERUM: CPT | Performed by: FAMILY MEDICINE

## 2024-08-16 PROCEDURE — 84439 ASSAY OF FREE THYROXINE: CPT | Performed by: FAMILY MEDICINE

## 2024-08-16 PROCEDURE — 82728 ASSAY OF FERRITIN: CPT | Performed by: FAMILY MEDICINE

## 2024-08-16 RX ORDER — MEGESTROL ACETATE 40 MG/1
40 TABLET ORAL DAILY
Qty: 30 TABLET | Refills: 11 | Status: SHIPPED | OUTPATIENT
Start: 2024-08-16 | End: 2025-08-16

## 2024-08-17 LAB
FOLATE SERPL-MCNC: 10.4 NG/ML (ref 4–24)
VIT B12 SERPL-MCNC: 355 PG/ML (ref 210–950)

## 2024-08-19 NOTE — PROGRESS NOTES
Subjective:       Patient ID: Josy Rehman is a 73 y.o. female.    Chief Complaint: Follow-up    Josy Rehman is a 73 y.o. female who presents to clinic for Follow-up    She is doing well today. Continues to have unintentional weight loss. Since her  death, she does not eat as much since he was the main cook. She is not able to drink meal-replacement drink due to issues with constipation when she drinks them. She has decreased appetite. Tried Mirtazapine in the past but stopped taking it due to dizziness. Open to trying another medication to stimulant appetite. She continues to grief her , but has a good relationship with her children and [employer] family.       Review of Systems   Constitutional:  Positive for unexpected weight change. Negative for chills and fever.   HENT:  Negative for congestion, rhinorrhea and sore throat.    Respiratory:  Negative for cough, shortness of breath and wheezing.    Cardiovascular:  Negative for chest pain, palpitations and leg swelling.   Gastrointestinal:  Negative for abdominal pain, constipation, diarrhea, nausea and vomiting.   Genitourinary:  Negative for dysuria, frequency and urgency.   Neurological:  Negative for headaches.   Psychiatric/Behavioral:  Negative for dysphoric mood.        Objective:      Physical Exam  Vitals reviewed.   Constitutional:       General: She is not in acute distress.     Appearance: She is ill-appearing.   HENT:      Head: Normocephalic and atraumatic.      Nose: No congestion or rhinorrhea.   Eyes:      General: No scleral icterus.        Right eye: No discharge.         Left eye: No discharge.      Extraocular Movements: Extraocular movements intact.      Conjunctiva/sclera: Conjunctivae normal.      Pupils: Pupils are equal, round, and reactive to light.   Cardiovascular:      Rate and Rhythm: Normal rate and regular rhythm.      Heart sounds: No murmur heard.     No friction rub. No gallop.   Pulmonary:      Effort:  Pulmonary effort is normal. No respiratory distress.      Breath sounds: Normal breath sounds. No stridor. No wheezing or rhonchi.   Abdominal:      General: Bowel sounds are normal.      Palpations: Abdomen is soft.   Musculoskeletal:      Right lower leg: No edema.      Left lower leg: No edema.   Skin:     General: Skin is warm.   Neurological:      General: No focal deficit present.      Mental Status: She is alert.   Psychiatric:         Mood and Affect: Mood normal.         Behavior: Behavior normal.         Assessment:       1. Weight loss    2. Grief    3. Severe protein-calorie malnutrition    4. Iron deficiency        Plan:   1. Weight loss  Chronic, uncontrolled, will assess for underlying cause and vitamin deficiencies, will place referral to GI to assess need for repeat colonoscopy   -     VITAMIN B12; Future; Expected date: 08/16/2024  -     FOLATE; Future; Expected date: 08/16/2024  -     IRON AND TIBC; Future; Expected date: 08/16/2024  -     COMPREHENSIVE METABOLIC PANEL; Future; Expected date: 08/16/2024  -     FERRITIN; Future; Expected date: 08/16/2024  -     CBC W/ AUTO DIFFERENTIAL; Future; Expected date: 08/16/2024  -     TSH; Future; Expected date: 08/16/2024  -     T4, FREE; Future; Expected date: 08/16/2024  -     T3, FREE; Future; Expected date: 08/16/2024    2. Grief  -     megestroL (MEGACE) 40 MG Tab; Take 1 tablet (40 mg total) by mouth once daily.  Dispense: 30 tablet; Refill: 11    3. Severe protein-calorie malnutrition  Chronic, uncontrolled, BMI 17  -     VITAMIN B12; Future; Expected date: 08/16/2024  -     FOLATE; Future; Expected date: 08/16/2024    4. Iron deficiency  -     IRON AND TIBC; Future; Expected date: 08/16/2024  -     FERRITIN; Future; Expected date: 08/16/2024      Future Appointments   Date Time Provider Department Center   9/12/2024  8:10 AM Malik Zendejas OD [facility]   9/12/2024  8:30 AM [id number]   10/21/2024  10:40 AM Jeimy Arreola MD [facility] Jono Arreola MD  [facility] [address]  [place] [address]  [phone]

## 2024-09-12 ENCOUNTER — OFFICE VISIT (OUTPATIENT)
Dept: OPHTHALMOLOGY | Facility: CLINIC | Age: 74
End: 2024-09-12
Payer: MEDICARE

## 2024-09-12 ENCOUNTER — HOSPITAL ENCOUNTER (OUTPATIENT)
Dept: RADIOLOGY | Facility: HOSPITAL | Age: 74
Discharge: HOME | End: 2024-09-12
Attending: FAMILY MEDICINE
Payer: MEDICARE

## 2024-09-12 VITALS — HEIGHT: 62 IN | WEIGHT: 92.81 LBS | BODY MASS INDEX: 17.08 KG/M2

## 2024-09-12 DIAGNOSIS — H52.4 PRESBYOPIA: ICD-10-CM

## 2024-09-12 DIAGNOSIS — Z96.1 PSEUDOPHAKIA OF BOTH EYES: Primary | ICD-10-CM

## 2024-09-12 DIAGNOSIS — Z12.31 ENCOUNTER FOR SCREENING MAMMOGRAM FOR BREAST CANCER: ICD-10-CM

## 2024-09-12 PROCEDURE — 77063 BREAST TOMOSYNTHESIS BI: CPT | Mod: TC

## 2024-09-12 PROCEDURE — 77067 SCR MAMMO BI INCL CAD: CPT | Mod: TC

## 2024-09-12 PROCEDURE — 77067 SCR MAMMO BI INCL CAD: CPT | Mod: 26,,, | Performed by: RADIOLOGY

## 2024-09-12 PROCEDURE — 99212 OFFICE O/P EST SF 10 MIN: CPT | Mod: PBBFAC | Performed by: OPTOMETRIST

## 2024-09-12 PROCEDURE — 99999 PR PBB SHADOW E&M-EST. PATIENT-LVL II: CPT | Mod: PBBFAC,,, | Performed by: OPTOMETRIST

## 2024-09-12 PROCEDURE — 92014 COMPRE OPH EXAM EST PT 1/>: CPT | Mod: S$PBB,,, | Performed by: OPTOMETRIST

## 2024-09-12 PROCEDURE — 77063 BREAST TOMOSYNTHESIS BI: CPT | Mod: 26,,, | Performed by: RADIOLOGY

## 2024-09-12 NOTE — PROGRESS NOTES
HPI    1. PCIOL OD 11/10/21  PCIOL OS 01/06/22  2. +HTN       Vision changes since last eye exam?: Pt states her night time driving   vision has been more blurry than her last eye exam but has no other   complaints of any new vision changes. Pt I using OTC +3.25 readers and is   not currently using any eyedrops.      Any eye pain today: Pt denies any eye pain.    Other ocular symptoms: None noticed by pt.     Interested in contact lens fitting today? No.     Last edited by Cele Mueller on 9/12/2024  8:26 AM.            Assessment /Plan     For exam results, see Encounter Report.    Pseudophakia of both eyes  Stable OU  Monitor 12 months    Presbyopia  Eyeglass Final Rx       Eyeglass Final Rx    PD 61                 Stable/Normal gOCT today OU      RTC 1 yr for dilated eye exam and gOCT or PRN if any problems.   Discussed above and answered questions.

## 2024-10-21 ENCOUNTER — LAB VISIT (OUTPATIENT)
Dept: LAB | Facility: HOSPITAL | Age: 74
End: 2024-10-21
Attending: FAMILY MEDICINE
Payer: MEDICARE

## 2024-10-21 ENCOUNTER — OFFICE VISIT (OUTPATIENT)
Dept: INTERNAL MEDICINE | Facility: CLINIC | Age: 74
End: 2024-10-21
Payer: MEDICARE

## 2024-10-21 VITALS
OXYGEN SATURATION: 99 % | SYSTOLIC BLOOD PRESSURE: 120 MMHG | HEIGHT: 62 IN | RESPIRATION RATE: 18 BRPM | HEART RATE: 79 BPM | BODY MASS INDEX: 17.83 KG/M2 | WEIGHT: 96.88 LBS | DIASTOLIC BLOOD PRESSURE: 60 MMHG | TEMPERATURE: 98 F

## 2024-10-21 DIAGNOSIS — Z23 NEED FOR VACCINATION: ICD-10-CM

## 2024-10-21 DIAGNOSIS — I70.0 AORTIC ATHEROSCLEROSIS: ICD-10-CM

## 2024-10-21 DIAGNOSIS — I10 ESSENTIAL HYPERTENSION: Chronic | ICD-10-CM

## 2024-10-21 DIAGNOSIS — N18.31 CHRONIC KIDNEY DISEASE, STAGE 3A: ICD-10-CM

## 2024-10-21 DIAGNOSIS — I10 ESSENTIAL HYPERTENSION: Primary | Chronic | ICD-10-CM

## 2024-10-21 DIAGNOSIS — E43 SEVERE PROTEIN-CALORIE MALNUTRITION: ICD-10-CM

## 2024-10-21 LAB
ALBUMIN SERPL BCP-MCNC: 4.3 G/DL (ref 3.5–5.2)
ALP SERPL-CCNC: 38 U/L (ref 40–150)
ALT SERPL W/O P-5'-P-CCNC: 10 U/L (ref 10–44)
ANION GAP SERPL CALC-SCNC: 10 MMOL/L (ref 8–16)
AST SERPL-CCNC: 20 U/L (ref 10–40)
BILIRUB SERPL-MCNC: 0.7 MG/DL (ref 0.1–1)
BUN SERPL-MCNC: 15 MG/DL (ref 8–23)
CALCIUM SERPL-MCNC: 10.4 MG/DL (ref 8.7–10.5)
CHLORIDE SERPL-SCNC: 110 MMOL/L (ref 95–110)
CO2 SERPL-SCNC: 23 MMOL/L (ref 23–29)
CREAT SERPL-MCNC: 1 MG/DL (ref 0.5–1.4)
EST. GFR  (NO RACE VARIABLE): 58.8 ML/MIN/1.73 M^2
GLUCOSE SERPL-MCNC: 83 MG/DL (ref 70–110)
POTASSIUM SERPL-SCNC: 3.8 MMOL/L (ref 3.5–5.1)
PROT SERPL-MCNC: 7.6 G/DL (ref 6–8.4)
SODIUM SERPL-SCNC: 143 MMOL/L (ref 136–145)

## 2024-10-21 PROCEDURE — 99999 PR PBB SHADOW E&M-EST. PATIENT-LVL IV: CPT | Mod: PBBFAC,,, | Performed by: FAMILY MEDICINE

## 2024-10-21 PROCEDURE — G2211 COMPLEX E/M VISIT ADD ON: HCPCS | Mod: S$PBB,,, | Performed by: FAMILY MEDICINE

## 2024-10-21 PROCEDURE — G0008 ADMIN INFLUENZA VIRUS VAC: HCPCS | Mod: PBBFAC,PN

## 2024-10-21 PROCEDURE — 90653 IIV ADJUVANT VACCINE IM: CPT | Mod: PBBFAC,PN

## 2024-10-21 PROCEDURE — 99999PBSHW PR PBB SHADOW TECHNICAL ONLY FILED TO HB: Mod: PBBFAC,,,

## 2024-10-21 PROCEDURE — 99214 OFFICE O/P EST MOD 30 MIN: CPT | Mod: PBBFAC,PN,25 | Performed by: FAMILY MEDICINE

## 2024-10-21 PROCEDURE — 36415 COLL VENOUS BLD VENIPUNCTURE: CPT | Mod: PN | Performed by: FAMILY MEDICINE

## 2024-10-21 PROCEDURE — 99214 OFFICE O/P EST MOD 30 MIN: CPT | Mod: S$PBB,,, | Performed by: FAMILY MEDICINE

## 2024-10-21 PROCEDURE — 80053 COMPREHEN METABOLIC PANEL: CPT | Performed by: FAMILY MEDICINE

## 2024-10-21 RX ORDER — POTASSIUM CHLORIDE 750 MG/1
10 TABLET, EXTENDED RELEASE ORAL DAILY
Qty: 90 TABLET | Refills: 3 | Status: SHIPPED | OUTPATIENT
Start: 2024-10-21 | End: 2024-11-03

## 2024-10-21 RX ORDER — HYDROCHLOROTHIAZIDE 12.5 MG/1
12.5 CAPSULE ORAL DAILY
Qty: 90 CAPSULE | Refills: 3 | Status: SHIPPED | OUTPATIENT
Start: 2024-10-21 | End: 2024-10-23 | Stop reason: ALTCHOICE

## 2024-10-21 RX ADMIN — INFLUENZA A VIRUS A/VICTORIA/4897/2022 IVR-238 (H1N1) ANTIGEN (FORMALDEHYDE INACTIVATED), INFLUENZA A VIRUS A/THAILAND/8/2022 IVR-237 (H3N2) ANTIGEN (FORMALDEHYDE INACTIVATED), INFLUENZA B VIRUS B/AUSTRIA/1359417/2021 BVR-26 ANTIGEN (FORMALDEHYDE INACTIVATED) 0.5 ML: 15; 15; 15 INJECTION, SUSPENSION INTRAMUSCULAR at 11:10

## 2024-10-23 DIAGNOSIS — N18.31 CKD STAGE 3A, GFR 45-59 ML/MIN: ICD-10-CM

## 2024-10-23 DIAGNOSIS — I10 ESSENTIAL HYPERTENSION: Primary | ICD-10-CM

## 2024-10-23 RX ORDER — LOSARTAN POTASSIUM 25 MG/1
25 TABLET ORAL DAILY
Qty: 90 TABLET | Refills: 3 | Status: SHIPPED | OUTPATIENT
Start: 2024-10-23 | End: 2025-10-23

## 2024-10-24 ENCOUNTER — TELEPHONE (OUTPATIENT)
Dept: INTERNAL MEDICINE | Facility: CLINIC | Age: 74
End: 2024-10-24
Payer: MEDICARE

## 2024-10-24 NOTE — TELEPHONE ENCOUNTER
----- Message from Jeimy Arreola MD sent at 10/23/2024 11:59 PM CDT -----  Please inform Mrs. Rehman her kidney function continues to be decreased. I believe the hydrochlorothiazide is contributing to her decreased renal function. I recommend discontinuing this hydrochlorothiazide. I have ordered additional studies (urine testing and ultrasound of her kidneys) for thoroughness. I have sent a prescription for losartan 25 mg to her pharmacy. I recommend discontinue potassium supplementation as well when starting this medication.

## 2024-10-24 NOTE — TELEPHONE ENCOUNTER
Pt was informed to discontinue the hydrochlorothiazide and the potassium. Pt was also informed that a prescription for Losartan will be sent in for her. Pt stated understanding. Pt's appointments were scheduled for next Wednesday.

## 2024-10-25 ENCOUNTER — TELEPHONE (OUTPATIENT)
Dept: INTERNAL MEDICINE | Facility: CLINIC | Age: 74
End: 2024-10-25
Payer: MEDICARE

## 2024-10-25 NOTE — TELEPHONE ENCOUNTER
----- Message from Interviu Me sent at 10/25/2024  7:54 AM CDT -----  Contact: self  ..Type:  Questions     Who Called: .Josy Rehman  Would the patient rather a call back or a response via [facility]? Call back   Best Call Back Number: .[phone]   Additional Information: pt is asking for an return call in reference to questions she has about scheduled US of her kidneys

## 2024-10-25 NOTE — TELEPHONE ENCOUNTER
I returned  a call back to the pt who was inquiring about any instructions regarding her US . I advised of the instructions and asked if she has picked up her losartan. She replied no but, will  and start it today. She also confirmed recommendation from  to stop taking Hctz and potassium. I verbalized understanding. /she replied thanks for explaining as she was concerned. //kah

## 2024-10-30 ENCOUNTER — HOSPITAL ENCOUNTER (OUTPATIENT)
Dept: RADIOLOGY | Facility: HOSPITAL | Age: 74
Discharge: HOME | End: 2024-10-30
Attending: FAMILY MEDICINE
Payer: MEDICARE

## 2024-10-30 DIAGNOSIS — N18.31 CKD STAGE 3A, GFR 45-59 ML/MIN: ICD-10-CM

## 2024-10-30 PROCEDURE — 76770 US EXAM ABDO BACK WALL COMP: CPT | Mod: 26,,, | Performed by: RADIOLOGY

## 2024-10-30 PROCEDURE — 76770 US EXAM ABDO BACK WALL COMP: CPT | Mod: TC

## 2024-11-03 PROBLEM — N18.31 CHRONIC KIDNEY DISEASE, STAGE 3A: Status: ACTIVE | Noted: 2024-11-03

## 2024-11-03 PROBLEM — R94.4 DECREASED GFR: Status: ACTIVE | Noted: 2024-11-03

## 2024-11-03 PROBLEM — E43 SEVERE PROTEIN-CALORIE MALNUTRITION: Status: ACTIVE | Noted: 2024-11-03

## 2024-11-03 PROBLEM — R94.4 DECREASED GFR: Status: RESOLVED | Noted: 2024-11-03 | Resolved: 2024-11-03

## 2024-11-03 NOTE — ASSESSMENT & PLAN NOTE
Chronic, stable, GFR 58, potentially secondary to age along with use of diuretic, will discontinue HCTZ and start losartan 25 mg for renal protection

## 2024-11-03 NOTE — PROGRESS NOTES
Monitor. Subjective:       Patient ID: Josy Rehman is a 74 y.o. female.    Chief Complaint: Follow-up (2 month, weight)    Josy Rehman is a 74 y.o. female who presents to clinic for Follow-up (2 month, weight)   She has been doing well, increased appetite with starting Megace. Gained 4 lbs since her LOV. Request refill of her blood pressure medication today.       Review of Systems   Constitutional:  Negative for chills and fever.   HENT:  Negative for congestion, rhinorrhea and sore throat.    Respiratory:  Negative for cough, shortness of breath and wheezing.    Cardiovascular:  Negative for chest pain, palpitations and leg swelling.   Gastrointestinal:  Negative for abdominal pain, constipation, diarrhea, nausea and vomiting.   Genitourinary:  Negative for dysuria, frequency and urgency.   Neurological:  Negative for headaches.   Psychiatric/Behavioral:  Negative for dysphoric mood.        Objective:      Physical Exam  Vitals reviewed.   HENT:      Head: Normocephalic and atraumatic.      Nose: No congestion or rhinorrhea.   Eyes:      General: No scleral icterus.        Right eye: No discharge.         Left eye: No discharge.      Extraocular Movements: Extraocular movements intact.      Conjunctiva/sclera: Conjunctivae normal.      Pupils: Pupils are equal, round, and reactive to light.   Cardiovascular:      Rate and Rhythm: Normal rate and regular rhythm.      Heart sounds: No murmur heard.     No friction rub. No gallop.   Pulmonary:      Effort: Pulmonary effort is normal. No respiratory distress.      Breath sounds: Normal breath sounds. No stridor. No wheezing or rhonchi.   Abdominal:      General: Bowel sounds are normal.      Palpations: Abdomen is soft.   Musculoskeletal:      Right lower leg: No edema.      Left lower leg: No edema.   Skin:     General: Skin is warm.   Neurological:      General: No focal deficit present.      Mental Status: She is alert.   Psychiatric:         Mood and  Affect: Mood normal.         Behavior: Behavior normal.         Assessment:       1. Essential hypertension    2. Chronic kidney disease, stage 3a    3. Aortic atherosclerosis    4. Severe protein-calorie malnutrition    5. Need for vaccination        Plan:   1. Essential hypertension  Overview:  -at goal today, discontinue HCTZ and potassium due to decreased GFR and start losartan 25 for renal protection  - Current Hypertension Medications:   Hypertension Medications               losartan (COZAAR) 25 MG tablet Take 1 tablet (25 mg total) by mouth once daily.          -continue lifestyle modification with low sodium diet and exercise   -discussed hypertension disease course and importance of treating high blood pressure  -patient understood and advised of risk of untreated blood pressure.  ER precautions were given   for symptoms of hypertensive urgency and emergency.     Orders:  -     Discontinue: hydroCHLOROthiazide (MICROZIDE) 12.5 mg capsule; Take 1 capsule (12.5 mg total) by mouth once daily.  Dispense: 90 capsule; Refill: 3  -     Discontinue: potassium chloride SA (K-DUR,KLOR-CON M) 10 MEQ tablet; Take 1 tablet (10 mEq total) by mouth once daily.  Dispense: 90 tablet; Refill: 3  -     COMPREHENSIVE METABOLIC PANEL; Future; Expected date: 10/21/2024    2. Chronic kidney disease, stage 3a  Assessment & Plan:  Chronic, stable, GFR 58, potentially secondary to age along with use of diuretic, will discontinue HCTZ and start losartan 25 mg for renal protection      3. Aortic atherosclerosis  Overview:  11/28/2018 CT scan- There is a mild amount of atherosclerosis       4. Severe protein-calorie malnutrition  Assessment & Plan:  Chronic, uncontrolled, denies depression, but has struggled with appetite since her  passed away, was on mirtazpine but discontinued due to dizziness, started on megace at LOV with improvement in weight, advised to drink meal replacement shakes (Ensure, Boost) but patient reports  increased constipation       5. Need for vaccination  -     influenza (adjuvanted) (Fluad) 45 mcg/0.5 mL IM vaccine (> or = 66 yo) 0.5 mL       RTC 3 months for follow-up    Future Appointments   Date Time Provider Department Center   1/21/2025 10:40 AM Jeimy Arreola MD [facility]   9/15/2025  9:50 AM Malik Zendejas, OD [facility]       Jeimy Arreola MD  [facility] [address]  [place] [address]  [phone]

## 2024-11-03 NOTE — ASSESSMENT & PLAN NOTE
Chronic, uncontrolled, denies depression, but has struggled with appetite since her  passed away, was on mirtazpine but discontinued due to dizziness, started on megace at LOV with improvement in weight, advised to drink meal replacement shakes (Ensure, Boost) but patient reports increased constipation

## 2024-12-17 ENCOUNTER — PATIENT MESSAGE (OUTPATIENT)
Dept: INTERNAL MEDICINE | Facility: CLINIC | Age: 74
End: 2024-12-17
Payer: MEDICARE

## 2025-01-05 NOTE — TELEPHONE ENCOUNTER
Done.  
Last annual: 10/31/19  Last mammo: 8/5/2019  Patient advised that she isn't due for another mammo until August. She said she knew already, but wanted to schedule it early.    Please enter/sign mammogram order    ----- Message from Cynthia Marcelo sent at 1/24/2020 10:53 AM CST -----  Contact: pt  .Type:  Patient Returning Call    Who Called: pt  Who Left Message for Patient:   Does the patient know what this is regarding?:   Would the patient rather a call back or a response via [facility]? Call back   Best Call Back Number: [phone] (home)   Additional Information:        
Mammogram scheduled with the patient.    ----- Message from Nestor Guallpa sent at 1/24/2020  2:37 PM CST -----  Contact: PT   Type:  Patient Returning Call    Who Called:PT   Who Left Message for Patient: genaro  Does the patient know what this is regarding?:yes   Would the patient rather a call back or a response via [facility]? Call back   Best Call Back Number: [phone] (home)   Additional Information: n/a     
No answerX2  
no
05-Jan-2025 15:43

## 2025-01-17 DIAGNOSIS — I10 ESSENTIAL HYPERTENSION: ICD-10-CM

## 2025-01-17 NOTE — TELEPHONE ENCOUNTER
----- Message from Sandranicolás sent at 1/17/2025 12:26 PM CST -----  Contact: Josy  Type:  RX Refill Request    Who Called: Josy  Refill or New Rx:refill  RX Name and Strength:losartan (COZAAR) 25 MG tablet   How is the patient currently taking it? (ex. 1XDay):  Is this a 30 day or 90 day RX:  Preferred Pharmacy with phone number:  [facility]/pharmacy #[id number] - [place], [place]  [facility] [address]  Phone: [phone] Fax: [phone]      Local or Mail Order:local  Ordering Provider:Josy  Would the patient rather a call back or a response via [facility]? call  Best Call Back Number:[phone]  Additional Information: Pt will be out next Tuesday

## 2025-01-18 RX ORDER — LOSARTAN POTASSIUM 25 MG/1
25 TABLET ORAL DAILY
Qty: 90 TABLET | Refills: 3 | Status: SHIPPED | OUTPATIENT
Start: 2025-01-18 | End: 2026-01-18

## 2025-01-27 ENCOUNTER — OFFICE VISIT (OUTPATIENT)
Dept: INTERNAL MEDICINE | Facility: CLINIC | Age: 75
End: 2025-01-27
Payer: MEDICARE

## 2025-01-27 ENCOUNTER — LAB VISIT (OUTPATIENT)
Dept: LAB | Facility: HOSPITAL | Age: 75
End: 2025-01-27
Attending: FAMILY MEDICINE
Payer: MEDICARE

## 2025-01-27 VITALS
TEMPERATURE: 98 F | DIASTOLIC BLOOD PRESSURE: 62 MMHG | OXYGEN SATURATION: 98 % | BODY MASS INDEX: 18.99 KG/M2 | WEIGHT: 103.19 LBS | SYSTOLIC BLOOD PRESSURE: 130 MMHG | RESPIRATION RATE: 18 BRPM | HEART RATE: 79 BPM | HEIGHT: 62 IN

## 2025-01-27 DIAGNOSIS — Z23 NEED FOR VACCINATION: ICD-10-CM

## 2025-01-27 DIAGNOSIS — I10 ESSENTIAL HYPERTENSION: ICD-10-CM

## 2025-01-27 DIAGNOSIS — E43 SEVERE PROTEIN-CALORIE MALNUTRITION: ICD-10-CM

## 2025-01-27 DIAGNOSIS — E61.1 IRON DEFICIENCY: ICD-10-CM

## 2025-01-27 DIAGNOSIS — N18.31 CHRONIC KIDNEY DISEASE, STAGE 3A: ICD-10-CM

## 2025-01-27 DIAGNOSIS — I10 ESSENTIAL HYPERTENSION: Primary | ICD-10-CM

## 2025-01-27 PROCEDURE — 99999 PR PBB SHADOW E&M-EST. PATIENT-LVL IV: CPT | Mod: PBBFAC,,, | Performed by: FAMILY MEDICINE

## 2025-01-27 PROCEDURE — 80061 LIPID PANEL: CPT | Performed by: FAMILY MEDICINE

## 2025-01-27 PROCEDURE — G2211 COMPLEX E/M VISIT ADD ON: HCPCS | Mod: S$PBB,,, | Performed by: FAMILY MEDICINE

## 2025-01-27 PROCEDURE — 36415 COLL VENOUS BLD VENIPUNCTURE: CPT | Mod: PN | Performed by: FAMILY MEDICINE

## 2025-01-27 PROCEDURE — 80053 COMPREHEN METABOLIC PANEL: CPT | Performed by: FAMILY MEDICINE

## 2025-01-27 PROCEDURE — 85025 COMPLETE CBC W/AUTO DIFF WBC: CPT | Performed by: FAMILY MEDICINE

## 2025-01-27 PROCEDURE — 83540 ASSAY OF IRON: CPT | Performed by: FAMILY MEDICINE

## 2025-01-27 PROCEDURE — 99214 OFFICE O/P EST MOD 30 MIN: CPT | Mod: PBBFAC,PN | Performed by: FAMILY MEDICINE

## 2025-01-27 PROCEDURE — 99214 OFFICE O/P EST MOD 30 MIN: CPT | Mod: S$PBB,,, | Performed by: FAMILY MEDICINE

## 2025-01-27 PROCEDURE — 82728 ASSAY OF FERRITIN: CPT | Performed by: FAMILY MEDICINE

## 2025-01-27 NOTE — PROGRESS NOTES
Subjective:       Patient ID: Josy Rehman is a 74 y.o. female.    Chief Complaint: Follow-up (3 month)    Josy Rehman is 74 y.o. female who present for follow up. She is doing well today. She has gained 7 lbs since her last appointment. Currently tolerating medication well, denies any side effects.       Review of Systems   Constitutional:  Positive for unexpected weight change. Negative for activity change.   HENT:  Negative for hearing loss, rhinorrhea and trouble swallowing.    Eyes:  Negative for discharge and visual disturbance.   Respiratory:  Negative for chest tightness and wheezing.    Cardiovascular:  Negative for chest pain and palpitations.   Gastrointestinal:  Negative for blood in stool, constipation, diarrhea and vomiting.   Endocrine: Negative for polydipsia and polyuria.   Genitourinary:  Negative for difficulty urinating, dysuria, hematuria and menstrual problem.   Musculoskeletal:  Negative for arthralgias, joint swelling and neck pain.   Neurological:  Negative for weakness and headaches.   Psychiatric/Behavioral:  Negative for confusion and dysphoric mood.        Objective:      Physical Exam  Vitals reviewed.   HENT:      Head: Normocephalic and atraumatic.      Nose: No congestion or rhinorrhea.   Eyes:      General: No scleral icterus.        Right eye: No discharge.         Left eye: No discharge.      Extraocular Movements: Extraocular movements intact.      Conjunctiva/sclera: Conjunctivae normal.      Pupils: Pupils are equal, round, and reactive to light.   Cardiovascular:      Rate and Rhythm: Normal rate and regular rhythm.      Heart sounds: No murmur heard.     No friction rub. No gallop.   Pulmonary:      Effort: Pulmonary effort is normal. No respiratory distress.      Breath sounds: Normal breath sounds. No stridor. No wheezing or rhonchi.   Abdominal:      General: Bowel sounds are normal.      Palpations: Abdomen is soft.   Musculoskeletal:      Right lower leg:  No edema.      Left lower leg: No edema.   Skin:     General: Skin is warm.   Neurological:      General: No focal deficit present.      Mental Status: She is alert.   Psychiatric:         Mood and Affect: Mood normal.         Behavior: Behavior normal.           The 10-year ASCVD risk score (Jeff BARROS, et al., 2019) is: 17.8%    Values used to calculate the score:      Age: 74 years      Sex: Female      Is Non- : Yes      Diabetic: No      Tobacco smoker: No      Systolic Blood Pressure: 130 mmHg      Is BP treated: Yes      HDL Cholesterol: 61 mg/dL      Total Cholesterol: 223 mg/dL   Assessment:       1. Essential hypertension    2. Chronic kidney disease, stage 3a    3. Severe protein-calorie malnutrition    4. Need for vaccination    5. Iron deficiency        Plan:   1. Essential hypertension  Overview:  -at goal today, discontinue HCTZ and potassium due to decreased GFR and start losartan 25 for renal protection, discussed adding statin to regimen for primary prevention  - Current Hypertension Medications:   Hypertension Medications               losartan (COZAAR) 25 MG tablet Take 1 tablet (25 mg total) by mouth once daily.          -continue lifestyle modification with low sodium diet and exercise   -discussed hypertension disease course and importance of treating high blood pressure  -patient understood and advised of risk of untreated blood pressure.  ER precautions were given   for symptoms of hypertensive urgency and emergency.     Orders:  -     CBC Auto Differential; Future; Expected date: 01/27/2025  -     Comprehensive Metabolic Panel; Future; Expected date: 01/27/2025  -     Lipid Panel; Future; Expected date: 01/27/2025    2. Chronic kidney disease, stage 3a  Chronic, stable, GFR 47, Creatinine 1.2  -     CBC Auto Differential; Future; Expected date: 01/27/2025  -     Comprehensive Metabolic Panel; Future; Expected date: 01/27/2025  -     Lipid Panel; Future; Expected date:  01/27/2025    3. Severe protein-calorie malnutrition  Chronic, improving, continue Megace   -     CBC Auto Differential; Future; Expected date: 01/27/2025    4. Need for vaccination    5. Iron deficiency  -     CBC Auto Differential; Future; Expected date: 01/27/2025       RTC 3 month for follow up       Future Appointments   Date Time Provider Department Center   9/15/2025  9:50 AM Malik Zendejas, OD [facility]         Jeimy Arreola MD  [facility] [address]  [place] [address]  [phone]

## 2025-01-28 DIAGNOSIS — F43.21 GRIEF: ICD-10-CM

## 2025-01-28 LAB
ALBUMIN SERPL BCP-MCNC: 4.2 G/DL (ref 3.5–5.2)
ALP SERPL-CCNC: 40 U/L (ref 40–150)
ALT SERPL W/O P-5'-P-CCNC: 10 U/L (ref 10–44)
ANION GAP SERPL CALC-SCNC: 8 MMOL/L (ref 8–16)
AST SERPL-CCNC: 22 U/L (ref 10–40)
BASOPHILS # BLD AUTO: 0.06 K/UL (ref 0–0.2)
BASOPHILS NFR BLD: 1.3 % (ref 0–1.9)
BILIRUB SERPL-MCNC: 0.7 MG/DL (ref 0.1–1)
BUN SERPL-MCNC: 14 MG/DL (ref 8–23)
CALCIUM SERPL-MCNC: 10.1 MG/DL (ref 8.7–10.5)
CHLORIDE SERPL-SCNC: 111 MMOL/L (ref 95–110)
CHOLEST SERPL-MCNC: 176 MG/DL (ref 120–199)
CHOLEST/HDLC SERPL: 3.1 {RATIO} (ref 2–5)
CO2 SERPL-SCNC: 21 MMOL/L (ref 23–29)
CREAT SERPL-MCNC: 1.2 MG/DL (ref 0.5–1.4)
DIFFERENTIAL METHOD BLD: ABNORMAL
EOSINOPHIL # BLD AUTO: 0.1 K/UL (ref 0–0.5)
EOSINOPHIL NFR BLD: 2.6 % (ref 0–8)
ERYTHROCYTE [DISTWIDTH] IN BLOOD BY AUTOMATED COUNT: 12.3 % (ref 11.5–14.5)
EST. GFR  (NO RACE VARIABLE): 47.2 ML/MIN/1.73 M^2
FERRITIN SERPL-MCNC: 615 NG/ML (ref 20–300)
GLUCOSE SERPL-MCNC: 83 MG/DL (ref 70–110)
HCT VFR BLD AUTO: 38.6 % (ref 37–48.5)
HDLC SERPL-MCNC: 57 MG/DL (ref 40–75)
HDLC SERPL: 32.4 % (ref 20–50)
HGB BLD-MCNC: 11.9 G/DL (ref 12–16)
IMM GRANULOCYTES # BLD AUTO: 0.01 K/UL (ref 0–0.04)
IMM GRANULOCYTES NFR BLD AUTO: 0.2 % (ref 0–0.5)
IRON SERPL-MCNC: 94 UG/DL (ref 30–160)
LDLC SERPL CALC-MCNC: 107.4 MG/DL (ref 63–159)
LYMPHOCYTES # BLD AUTO: 2 K/UL (ref 1–4.8)
LYMPHOCYTES NFR BLD: 43.2 % (ref 18–48)
MCH RBC QN AUTO: 29.1 PG (ref 27–31)
MCHC RBC AUTO-ENTMCNC: 30.8 G/DL (ref 32–36)
MCV RBC AUTO: 94 FL (ref 82–98)
MONOCYTES # BLD AUTO: 0.6 K/UL (ref 0.3–1)
MONOCYTES NFR BLD: 12.6 % (ref 4–15)
NEUTROPHILS # BLD AUTO: 1.9 K/UL (ref 1.8–7.7)
NEUTROPHILS NFR BLD: 40.1 % (ref 38–73)
NONHDLC SERPL-MCNC: 119 MG/DL
NRBC BLD-RTO: 0 /100 WBC
PLATELET # BLD AUTO: 255 K/UL (ref 150–450)
PMV BLD AUTO: 10.9 FL (ref 9.2–12.9)
POTASSIUM SERPL-SCNC: 4.7 MMOL/L (ref 3.5–5.1)
PROT SERPL-MCNC: 7.7 G/DL (ref 6–8.4)
RBC # BLD AUTO: 4.09 M/UL (ref 4–5.4)
SATURATED IRON: 28 % (ref 20–50)
SODIUM SERPL-SCNC: 140 MMOL/L (ref 136–145)
TOTAL IRON BINDING CAPACITY: 334 UG/DL (ref 250–450)
TRANSFERRIN SERPL-MCNC: 226 MG/DL (ref 200–375)
TRIGL SERPL-MCNC: 58 MG/DL (ref 30–150)
WBC # BLD AUTO: 4.61 K/UL (ref 3.9–12.7)

## 2025-01-28 NOTE — TELEPHONE ENCOUNTER
L/F: 08/16/24  L/V: 01/27/25  NEXT APPT: 04/29/25    ----- Message from Ivonne sent at 1/28/2025 12:00 PM CST -----  Type:  RX Refill Request    Who Called: pt  Refill or New Rx:refill  RX Name and Strength:megestroL (MEGACE) 40 MG Tab30 [id number] - Route: Take 1 tablet (40 mg total) by mouth once daily. - OralSent to pharmacy as: megestroL (MEGACE) 40 MG TabClass: NormalOrder: [id number]/Time Signed: 8/16/2024 10:17E-Prescribing Status: Receipt confirmed by pharmacy (8/16/2024 10:17 AM CDT)   Preferred Pharmacy with phone number:  [facility]/pharmacy #[id number] - [place], [place] [id number] [facility]  [address] [facility] [address]  Phone: [phone] Fax: [phone]  Local or Mail Order:local  Ordering Provider:na  Would the patient rather a call back or a response via [facility]? call  Best Call Back Number:[phone]  Additional Information: requesting refill

## 2025-01-29 RX ORDER — MEGESTROL ACETATE 40 MG/1
40 TABLET ORAL DAILY
Qty: 30 TABLET | Refills: 11 | Status: SHIPPED | OUTPATIENT
Start: 2025-01-29 | End: 2026-01-29

## 2025-01-29 RX ORDER — MEGESTROL ACETATE 40 MG/1
40 TABLET ORAL DAILY
Qty: 30 TABLET | Refills: 11 | OUTPATIENT
Start: 2025-01-29 | End: 2026-01-29

## 2025-01-29 NOTE — TELEPHONE ENCOUNTER
Spoke to patient and explained that the RX was sent twice so one was cancelled and the other will be sent in once Dr. Arreola approves it.       ----- Message from Kristin sent at 1/29/2025 10:13 AM CST -----  Regarding: Medical Advice  Contact: Josy  Type:  Needs Medical Advice    Who Called: Josy   Symptoms (please be specific):    How long has patient had these symptoms:      Pharmacy name and phone #:    [facility]/pharmacy #[id number] - [address] [facility] [address]  Phone: [phone] Fax: [phone]     Would the patient rather a call back or a response via [facility]? call  Best Call Back Number: [phone] (home)    Additional Information: Josy says the prescription was cancelled for RX: megestroL (MEGACE) 40 MG Tab was cancelled according to the [facility] Tech. Please renew the refill requests.

## 2025-02-07 ENCOUNTER — PATIENT MESSAGE (OUTPATIENT)
Dept: INTERNAL MEDICINE | Facility: CLINIC | Age: 75
End: 2025-02-07
Payer: MEDICARE

## 2025-02-24 DIAGNOSIS — Z00.00 ENCOUNTER FOR MEDICARE ANNUAL WELLNESS EXAM: ICD-10-CM

## 2025-04-29 ENCOUNTER — OFFICE VISIT (OUTPATIENT)
Dept: INTERNAL MEDICINE | Facility: CLINIC | Age: 75
End: 2025-04-29
Payer: MEDICARE

## 2025-04-29 ENCOUNTER — LAB VISIT (OUTPATIENT)
Dept: LAB | Facility: HOSPITAL | Age: 75
End: 2025-04-29
Attending: FAMILY MEDICINE
Payer: MEDICARE

## 2025-04-29 VITALS
SYSTOLIC BLOOD PRESSURE: 112 MMHG | TEMPERATURE: 99 F | BODY MASS INDEX: 19.23 KG/M2 | HEART RATE: 72 BPM | RESPIRATION RATE: 18 BRPM | DIASTOLIC BLOOD PRESSURE: 66 MMHG | OXYGEN SATURATION: 99 % | WEIGHT: 104.5 LBS | HEIGHT: 62 IN

## 2025-04-29 DIAGNOSIS — I10 ESSENTIAL HYPERTENSION: Primary | ICD-10-CM

## 2025-04-29 DIAGNOSIS — F43.21 GRIEF: ICD-10-CM

## 2025-04-29 DIAGNOSIS — I70.0 AORTIC ATHEROSCLEROSIS: ICD-10-CM

## 2025-04-29 DIAGNOSIS — I10 ESSENTIAL HYPERTENSION: ICD-10-CM

## 2025-04-29 LAB
ALBUMIN SERPL BCP-MCNC: 4.3 G/DL (ref 3.5–5.2)
ALP SERPL-CCNC: 44 UNIT/L (ref 40–150)
ALT SERPL W/O P-5'-P-CCNC: 10 UNIT/L (ref 10–44)
ANION GAP (OHS): 10 MMOL/L (ref 8–16)
AST SERPL-CCNC: 24 UNIT/L (ref 11–45)
BILIRUB SERPL-MCNC: 0.8 MG/DL (ref 0.1–1)
BUN SERPL-MCNC: 17 MG/DL (ref 8–23)
CALCIUM SERPL-MCNC: 10.2 MG/DL (ref 8.7–10.5)
CHLORIDE SERPL-SCNC: 109 MMOL/L (ref 95–110)
CO2 SERPL-SCNC: 21 MMOL/L (ref 23–29)
CREAT SERPL-MCNC: 1.1 MG/DL (ref 0.5–1.4)
GFR SERPLBLD CREATININE-BSD FMLA CKD-EPI: 53 ML/MIN/1.73/M2
GLUCOSE SERPL-MCNC: 80 MG/DL (ref 70–110)
POTASSIUM SERPL-SCNC: 4.5 MMOL/L (ref 3.5–5.1)
PROT SERPL-MCNC: 7.7 GM/DL (ref 6–8.4)
SODIUM SERPL-SCNC: 140 MMOL/L (ref 136–145)

## 2025-04-29 PROCEDURE — G2211 COMPLEX E/M VISIT ADD ON: HCPCS | Mod: S$PBB,,, | Performed by: FAMILY MEDICINE

## 2025-04-29 PROCEDURE — 99214 OFFICE O/P EST MOD 30 MIN: CPT | Mod: S$PBB,,, | Performed by: FAMILY MEDICINE

## 2025-04-29 PROCEDURE — 36415 COLL VENOUS BLD VENIPUNCTURE: CPT | Mod: PN

## 2025-04-29 PROCEDURE — 99999 PR PBB SHADOW E&M-EST. PATIENT-LVL III: CPT | Mod: PBBFAC,,, | Performed by: FAMILY MEDICINE

## 2025-04-29 PROCEDURE — 99213 OFFICE O/P EST LOW 20 MIN: CPT | Mod: PBBFAC,PN | Performed by: FAMILY MEDICINE

## 2025-04-29 PROCEDURE — 82040 ASSAY OF SERUM ALBUMIN: CPT

## 2025-04-29 RX ORDER — LOSARTAN POTASSIUM 25 MG/1
25 TABLET ORAL DAILY
Qty: 90 TABLET | Refills: 3 | Status: SHIPPED | OUTPATIENT
Start: 2025-04-29 | End: 2026-04-29

## 2025-04-29 RX ORDER — MEGESTROL ACETATE 40 MG/1
40 TABLET ORAL DAILY
Qty: 30 TABLET | Refills: 11 | Status: SHIPPED | OUTPATIENT
Start: 2025-04-29 | End: 2026-04-29

## 2025-04-29 NOTE — PROGRESS NOTES
Subjective:       Patient ID: Josy Rehman is a 74 y.o. female.    Chief Complaint: Hypertension (3 month f/u)    Doing well, weight stable, has no complaints today.     Hypertension  Pertinent negatives include no chest pain, headaches, neck pain or palpitations.     Review of Systems   Constitutional:  Positive for unexpected weight change. Negative for activity change.   HENT:  Negative for hearing loss, rhinorrhea and trouble swallowing.    Eyes:  Negative for discharge and visual disturbance.   Respiratory:  Negative for chest tightness and wheezing.    Cardiovascular:  Negative for chest pain and palpitations.   Gastrointestinal:  Negative for blood in stool, constipation, diarrhea and vomiting.   Endocrine: Negative for polydipsia and polyuria.   Genitourinary:  Negative for difficulty urinating, dysuria, hematuria and menstrual problem.   Musculoskeletal:  Negative for arthralgias, joint swelling and neck pain.   Neurological:  Negative for weakness and headaches.   Psychiatric/Behavioral:  Negative for confusion and dysphoric mood.        Objective:      Physical Exam  Vitals reviewed.   HENT:      Head: Normocephalic and atraumatic.      Nose: No congestion or rhinorrhea.   Eyes:      General: No scleral icterus.        Right eye: No discharge.         Left eye: No discharge.      Extraocular Movements: Extraocular movements intact.      Conjunctiva/sclera: Conjunctivae normal.      Pupils: Pupils are equal, round, and reactive to light.   Cardiovascular:      Rate and Rhythm: Normal rate and regular rhythm.      Heart sounds: No murmur heard.     No friction rub. No gallop.   Pulmonary:      Effort: Pulmonary effort is normal. No respiratory distress.      Breath sounds: Normal breath sounds. No stridor. No wheezing or rhonchi.   Abdominal:      General: Bowel sounds are normal.      Palpations: Abdomen is soft.   Musculoskeletal:      Right lower leg: No edema.      Left lower leg: No edema.    Skin:     General: Skin is warm.   Neurological:      General: No focal deficit present.      Mental Status: She is alert.   Psychiatric:         Mood and Affect: Mood normal.         Behavior: Behavior normal.         Assessment:       1. Essential hypertension    2. Grief    3. Aortic atherosclerosis        Plan:   1. Essential hypertension  Overview:  -at goal today, discontinue HCTZ and potassium due to decreased GFR and start losartan 25 for renal protection  - Current Hypertension Medications:   Hypertension Medications               losartan (COZAAR) 25 MG tablet Take 1 tablet (25 mg total) by mouth once daily.          -continue lifestyle modification with low sodium diet and exercise   -discussed hypertension disease course and importance of treating high blood pressure  -patient understood and advised of risk of untreated blood pressure.  ER precautions were given   for symptoms of hypertensive urgency and emergency.     Orders:  -     Cancel: Hypertension Digital Medicine (HDMP) Enrollment Order  -     losartan (COZAAR) 25 MG tablet; Take 1 tablet (25 mg total) by mouth once daily.  Dispense: 90 tablet; Refill: 3  -     Comprehensive Metabolic Panel; Future; Expected date: 04/29/2025    2. Grief  -     megestroL (MEGACE) 40 MG Tab; Take 1 tablet (40 mg total) by mouth once daily.  Dispense: 30 tablet; Refill: 11    3. Aortic atherosclerosis  Overview:  11/28/2018 CT scan- There is a mild amount of atherosclerosis              Future Appointments   Date Time Provider Department Center   7/30/2025  9:20 AM Jeimy Arreola MD [facility]   9/15/2025  8:45 AM Delicia Rehman NP [facility]   9/15/2025  9:50 AM Malik Zendejas, OD [facility]       Jeimy Arreola MD  [facility]   [address] [facility] [address] [facility] [address]  [place] [address]  [phone]    Answers submitted by the patient for this visit:  Review of Systems Questionnaire (Submitted on  4/22/2025)  activity change: No  unexpected weight change: Yes  neck pain: No  hearing loss: No  rhinorrhea: No  trouble swallowing: No  eye discharge: No  visual disturbance: No  chest tightness: No  wheezing: No  chest pain: No  palpitations: No  blood in stool: No  constipation: No  vomiting: No  diarrhea: No  polydipsia: No  polyuria: No  difficulty urinating: No  hematuria: No  menstrual problem: No  dysuria: No  joint swelling: No  arthralgias: No  headaches: No  weakness: No  confusion: No  dysphoric mood: No

## 2025-05-24 ENCOUNTER — RESULTS FOLLOW-UP (OUTPATIENT)
Dept: INTERNAL MEDICINE | Facility: CLINIC | Age: 75
End: 2025-05-24

## 2025-07-30 ENCOUNTER — LAB VISIT (OUTPATIENT)
Dept: LAB | Facility: HOSPITAL | Age: 75
End: 2025-07-30
Attending: FAMILY MEDICINE
Payer: MEDICARE

## 2025-07-30 ENCOUNTER — OFFICE VISIT (OUTPATIENT)
Dept: INTERNAL MEDICINE | Facility: CLINIC | Age: 75
End: 2025-07-30
Payer: MEDICARE

## 2025-07-30 VITALS
TEMPERATURE: 98 F | SYSTOLIC BLOOD PRESSURE: 135 MMHG | HEIGHT: 62 IN | BODY MASS INDEX: 19.92 KG/M2 | HEART RATE: 78 BPM | DIASTOLIC BLOOD PRESSURE: 70 MMHG | OXYGEN SATURATION: 97 % | WEIGHT: 108.25 LBS | RESPIRATION RATE: 18 BRPM

## 2025-07-30 DIAGNOSIS — I10 ESSENTIAL HYPERTENSION: ICD-10-CM

## 2025-07-30 DIAGNOSIS — N18.32 STAGE 3B CHRONIC KIDNEY DISEASE: ICD-10-CM

## 2025-07-30 DIAGNOSIS — I10 ESSENTIAL HYPERTENSION: Primary | ICD-10-CM

## 2025-07-30 DIAGNOSIS — Z78.0 POSTMENOPAUSAL: ICD-10-CM

## 2025-07-30 DIAGNOSIS — E55.9 VITAMIN D DEFICIENCY, UNSPECIFIED: ICD-10-CM

## 2025-07-30 DIAGNOSIS — L53.9 SKIN ERYTHEMA: ICD-10-CM

## 2025-07-30 DIAGNOSIS — F43.21 GRIEF: ICD-10-CM

## 2025-07-30 LAB
25(OH)D3+25(OH)D2 SERPL-MCNC: 65 NG/ML (ref 30–96)
ALBUMIN SERPL BCP-MCNC: 4.4 G/DL (ref 3.5–5.2)
ALP SERPL-CCNC: 47 UNIT/L (ref 40–150)
ALT SERPL W/O P-5'-P-CCNC: 10 UNIT/L (ref 0–55)
ANION GAP (OHS): 7 MMOL/L (ref 8–16)
AST SERPL-CCNC: 28 UNIT/L (ref 0–50)
BILIRUB SERPL-MCNC: 0.8 MG/DL (ref 0.1–1)
BUN SERPL-MCNC: 15 MG/DL (ref 8–23)
CALCIUM SERPL-MCNC: 10 MG/DL (ref 8.7–10.5)
CHLORIDE SERPL-SCNC: 111 MMOL/L (ref 95–110)
CHOLEST SERPL-MCNC: 203 MG/DL (ref 120–199)
CHOLEST/HDLC SERPL: 3.6 {RATIO} (ref 2–5)
CO2 SERPL-SCNC: 23 MMOL/L (ref 23–29)
CREAT SERPL-MCNC: 1.3 MG/DL (ref 0.5–1.4)
GFR SERPLBLD CREATININE-BSD FMLA CKD-EPI: 43 ML/MIN/1.73/M2
GLUCOSE SERPL-MCNC: 88 MG/DL (ref 70–110)
HDLC SERPL-MCNC: 57 MG/DL (ref 40–75)
HDLC SERPL: 28.1 % (ref 20–50)
LDLC SERPL CALC-MCNC: 132.2 MG/DL (ref 63–159)
NONHDLC SERPL-MCNC: 146 MG/DL
POTASSIUM SERPL-SCNC: 4.3 MMOL/L (ref 3.5–5.1)
PROT SERPL-MCNC: 7.6 GM/DL (ref 6–8.4)
SODIUM SERPL-SCNC: 141 MMOL/L (ref 136–145)
TRIGL SERPL-MCNC: 69 MG/DL (ref 30–150)

## 2025-07-30 PROCEDURE — 80061 LIPID PANEL: CPT

## 2025-07-30 PROCEDURE — 36415 COLL VENOUS BLD VENIPUNCTURE: CPT | Mod: PN

## 2025-07-30 PROCEDURE — 99214 OFFICE O/P EST MOD 30 MIN: CPT | Mod: PBBFAC,PN | Performed by: FAMILY MEDICINE

## 2025-07-30 PROCEDURE — 80053 COMPREHEN METABOLIC PANEL: CPT

## 2025-07-30 PROCEDURE — G2211 COMPLEX E/M VISIT ADD ON: HCPCS | Mod: ,,, | Performed by: FAMILY MEDICINE

## 2025-07-30 PROCEDURE — 99214 OFFICE O/P EST MOD 30 MIN: CPT | Mod: S$PBB,,, | Performed by: FAMILY MEDICINE

## 2025-07-30 PROCEDURE — 99999 PR PBB SHADOW E&M-EST. PATIENT-LVL IV: CPT | Mod: PBBFAC,,, | Performed by: FAMILY MEDICINE

## 2025-07-30 PROCEDURE — 82306 VITAMIN D 25 HYDROXY: CPT

## 2025-07-30 RX ORDER — LOSARTAN POTASSIUM 25 MG/1
25 TABLET ORAL DAILY
Qty: 90 TABLET | Refills: 3 | Status: SHIPPED | OUTPATIENT
Start: 2025-07-30 | End: 2026-07-30

## 2025-07-30 RX ORDER — MEGESTROL ACETATE 40 MG/1
40 TABLET ORAL DAILY
Qty: 30 TABLET | Refills: 11 | Status: SHIPPED | OUTPATIENT
Start: 2025-07-30 | End: 2026-07-30

## 2025-08-04 ENCOUNTER — TELEPHONE (OUTPATIENT)
Dept: INTERNAL MEDICINE | Facility: CLINIC | Age: 75
End: 2025-08-04
Payer: MEDICARE

## 2025-08-04 DIAGNOSIS — Z12.31 ENCOUNTER FOR SCREENING MAMMOGRAM FOR BREAST CANCER: Primary | ICD-10-CM

## 2025-08-04 NOTE — TELEPHONE ENCOUNTER
Copied from CRM #[id number]. Topic: General Inquiry - Patient Advice  >> Aug 4, 2025  8:19 AM Ivonne wrote:  Type:  Needs Medical Advice    Who Called: pt  Symptoms (please be specific): na   How long has patient had these symptoms:  maxwell  Pharmacy name and phone #:  na  Would the patient rather a call back or a response via [facility]? Call  Best Call Back Number: [phone]  Additional Information: pt requesting her annual mammo ordered so that she can schedule for the same day as her other appts as [place] to travel multiple days     Mammo has been ordered.

## 2025-08-12 PROBLEM — N18.32 STAGE 3B CHRONIC KIDNEY DISEASE: Status: ACTIVE | Noted: 2025-08-12

## 2025-08-12 PROBLEM — N18.31 CHRONIC KIDNEY DISEASE, STAGE 3A: Status: RESOLVED | Noted: 2024-11-03 | Resolved: 2025-08-12

## 2025-08-12 PROBLEM — E43 SEVERE PROTEIN-CALORIE MALNUTRITION: Status: RESOLVED | Noted: 2024-11-03 | Resolved: 2025-08-12

## 2025-08-12 PROBLEM — L53.9 SKIN ERYTHEMA: Status: ACTIVE | Noted: 2025-08-12

## 2025-08-12 PROBLEM — F43.21 GRIEF: Status: ACTIVE | Noted: 2025-08-12

## 2025-08-12 PROBLEM — Z78.0 POSTMENOPAUSAL: Status: ACTIVE | Noted: 2025-08-12

## (undated) DEVICE — DRAPE COLUMN DAVINCI XI

## (undated) DEVICE — CANNULA REDUCER 12-8MM

## (undated) DEVICE — TAPE SURG MEDIPORE 6X72IN

## (undated) DEVICE — COVER TIP CURVED SCISSORS XI

## (undated) DEVICE — STAPLER ENDOWRIST 45 WHITE XI

## (undated) DEVICE — SEE MEDLINE ITEM 152739

## (undated) DEVICE — SEE L#152161

## (undated) DEVICE — DRAPE ARM DAVINCI XI

## (undated) DEVICE — SOL NACL 0.9% INJ PF/50151

## (undated) DEVICE — APPLICATOR CHLORAPREP ORN 26ML

## (undated) DEVICE — GLOVE PROTEXIS HYDROGEL SZ7

## (undated) DEVICE — SUT PROLENE 2-0 SH 36IN BLU

## (undated) DEVICE — STAPLER ENDOWRIST 45 GREEN XI

## (undated) DEVICE — Device

## (undated) DEVICE — STAPLER CIRCULAR XL SEAL 25MM

## (undated) DEVICE — SEE MEDLINE ITEM 157117

## (undated) DEVICE — KIT ROBOTIC 3 ARM DA VINCI SI

## (undated) DEVICE — SEALER VESSEL DAVINCI XI

## (undated) DEVICE — MANIFOLD 4 PORT

## (undated) DEVICE — WARMER DRAPE STERILE LF

## (undated) DEVICE — SEE MEDLINE ITEM 157027

## (undated) DEVICE — NDL SAFETY 22G X 1.5 ECLIPSE

## (undated) DEVICE — IRRIGATOR ENDOWRIST XI SUCTION

## (undated) DEVICE — NDL PNEUMO INSUFFLATI 120MM

## (undated) DEVICE — GAUZE SPONGE 4X4 12PLY

## (undated) DEVICE — SYR 50CC LL

## (undated) DEVICE — TIP SUC SIGMOIDOSCOPE 18F 12IN

## (undated) DEVICE — KIT ANTIFOG

## (undated) DEVICE — HEMOSTAT SURGICEL 2X3IN

## (undated) DEVICE — COVER OVERHEAD SURG LT BLUE

## (undated) DEVICE — SYR 3CC LUER LOC

## (undated) DEVICE — SEE MEDLINE ITEM 152622

## (undated) DEVICE — SUT PDS II 96 1 VIO

## (undated) DEVICE — SEE MEDLINE ITEM 157125

## (undated) DEVICE — PACK DRAPE PERI/GYN TIBURON

## (undated) DEVICE — SYR 10CC LUER LOCK

## (undated) DEVICE — KIT WING PAD POSITIONING

## (undated) DEVICE — TROCAR ENDOPATH XCEL 5X100MM

## (undated) DEVICE — SOL NS 1000CC

## (undated) DEVICE — ELECTRODE REM PLYHSV RETURN 9

## (undated) DEVICE — EVACUATOR PLUME AWAY

## (undated) DEVICE — DRAPE ABDOMINAL TIBURON 14X11

## (undated) DEVICE — SYR 30CC LUER LOCK

## (undated) DEVICE — CLIP HEMO-LOK ML

## (undated) DEVICE — CANNULA SEAL 12MM

## (undated) DEVICE — TUBING HEATED INSUFFLATOR

## (undated) DEVICE — SUT 2/0 30IN SILK BLK BRAI

## (undated) DEVICE — STAPLER SKIN PROXIMATE WIDE

## (undated) DEVICE — GLOVE 7.0 PROTEXIS PI BLUE